# Patient Record
Sex: MALE | Race: WHITE | NOT HISPANIC OR LATINO | Employment: OTHER | URBAN - METROPOLITAN AREA
[De-identification: names, ages, dates, MRNs, and addresses within clinical notes are randomized per-mention and may not be internally consistent; named-entity substitution may affect disease eponyms.]

---

## 2019-07-31 ENCOUNTER — OFFICE VISIT (OUTPATIENT)
Dept: INTERNAL MEDICINE | Facility: CLINIC | Age: 79
End: 2019-07-31
Payer: MEDICARE

## 2019-07-31 VITALS
DIASTOLIC BLOOD PRESSURE: 68 MMHG | HEIGHT: 69 IN | HEART RATE: 70 BPM | TEMPERATURE: 97.8 F | BODY MASS INDEX: 26.14 KG/M2 | SYSTOLIC BLOOD PRESSURE: 108 MMHG | RESPIRATION RATE: 15 BRPM | OXYGEN SATURATION: 96 % | WEIGHT: 176.5 LBS

## 2019-07-31 DIAGNOSIS — K21.00 GASTROESOPHAGEAL REFLUX DISEASE WITH ESOPHAGITIS: ICD-10-CM

## 2019-07-31 DIAGNOSIS — M54.5 CHRONIC LOW BACK PAIN, UNSPECIFIED BACK PAIN LATERALITY, WITH SCIATICA PRESENCE UNSPECIFIED: Primary | ICD-10-CM

## 2019-07-31 DIAGNOSIS — R21 RASH: ICD-10-CM

## 2019-07-31 DIAGNOSIS — M25.552 HIP PAIN, LEFT: ICD-10-CM

## 2019-07-31 DIAGNOSIS — M80.00XD AGE-RELATED OSTEOPOROSIS WITH CURRENT PATHOLOGICAL FRACTURE WITH ROUTINE HEALING, SUBSEQUENT ENCOUNTER: ICD-10-CM

## 2019-07-31 DIAGNOSIS — H91.90 DEAFNESS, UNSPECIFIED LATERALITY: ICD-10-CM

## 2019-07-31 DIAGNOSIS — G89.29 CHRONIC LOW BACK PAIN, UNSPECIFIED BACK PAIN LATERALITY, WITH SCIATICA PRESENCE UNSPECIFIED: Primary | ICD-10-CM

## 2019-07-31 PROBLEM — M41.9 KYPHOSCOLIOSIS: Status: ACTIVE | Noted: 2019-07-31

## 2019-07-31 PROBLEM — M25.512 CHRONIC PAIN OF BOTH SHOULDERS: Status: ACTIVE | Noted: 2019-07-31

## 2019-07-31 PROBLEM — M25.511 CHRONIC PAIN OF BOTH SHOULDERS: Status: ACTIVE | Noted: 2019-07-31

## 2019-07-31 PROBLEM — E78.5 HYPERLIPIDEMIA LDL GOAL <130: Status: ACTIVE | Noted: 2019-07-31

## 2019-07-31 PROBLEM — H25.89 OTHER AGE-RELATED CATARACT: Status: ACTIVE | Noted: 2019-07-31

## 2019-07-31 PROCEDURE — 99204 OFFICE O/P NEW MOD 45 MIN: CPT | Performed by: INTERNAL MEDICINE

## 2019-07-31 RX ORDER — ALENDRONATE SODIUM 70 MG/1
70 TABLET ORAL
Qty: 12 TABLET | Refills: 3 | Status: SHIPPED | OUTPATIENT
Start: 2019-07-31 | End: 2021-02-08

## 2019-07-31 RX ORDER — ALENDRONATE SODIUM 70 MG/1
TABLET ORAL
COMMUNITY
Start: 2019-01-03 | End: 2019-07-31

## 2019-07-31 RX ORDER — FOLIC ACID 1 MG/1
TABLET ORAL
COMMUNITY
Start: 2019-04-20 | End: 2019-07-31

## 2019-07-31 RX ORDER — ACETAMINOPHEN 500 MG
500 TABLET ORAL 2 TIMES DAILY PRN
COMMUNITY

## 2019-07-31 RX ORDER — MULTIVITAMIN,THERAPEUTIC
1 TABLET ORAL DAILY
Qty: 90 TABLET | Refills: 3 | Status: SHIPPED | OUTPATIENT
Start: 2019-07-31 | End: 2020-09-02

## 2019-07-31 RX ORDER — SUCRALFATE 1 G/1
1 TABLET ORAL
COMMUNITY
Start: 2019-07-08 | End: 2023-01-01

## 2019-07-31 RX ORDER — DIPHENOXYLATE HYDROCHLORIDE AND ATROPINE SULFATE 2.5; .025 MG/1; MG/1
TABLET ORAL
Refills: 0 | COMMUNITY
Start: 2019-07-20 | End: 2021-02-08

## 2019-07-31 RX ORDER — FOLIC ACID 1 MG/1
1 TABLET ORAL DAILY
Qty: 90 TABLET | Refills: 3 | Status: SHIPPED | OUTPATIENT
Start: 2019-07-31 | End: 2020-09-02

## 2019-07-31 RX ORDER — MULTIVITAMIN,THERAPEUTIC
1 TABLET ORAL DAILY
COMMUNITY
End: 2019-07-31

## 2019-07-31 RX ORDER — TRIAMCINOLONE ACETONIDE 1 MG/G
CREAM TOPICAL 2 TIMES DAILY
Qty: 45 G | Refills: 0 | Status: SHIPPED | OUTPATIENT
Start: 2019-07-31 | End: 2019-12-16

## 2019-07-31 ASSESSMENT — MIFFLIN-ST. JEOR: SCORE: 1495.98

## 2019-07-31 NOTE — PROGRESS NOTES
Subjective     Michelle Wright is a 79 year old male who presents to clinic today for the following health issues:    HPI   New Patient/Transfer of Care.  Michelle is a brother of 1 of the patient's I see Mr. Nabor Wright.  The patient is deaf and requires a signing  to aid in conversation.  According to the patient's brother he was living in [place] and has been getting his health care through [facility] and is now transferred down here and living in the same building that Nabor and his family are living in.    Musculoskeletal problem/pain      Duration: 1 yr- intermittent     Description  Location: left hip     Intensity:  Mild to moderate     Accompanying signs and symptoms: Only bothersome with prolonged walking     History  Previous similar problem: no   Previous evaluation:  none    Precipitating or alleviating factors:  Trauma or overuse: no   Aggravating factors include: Painful with walking long distances     Therapies tried and outcome: acetaminophen, help a little     Other concerns:  1. Intermittent lower back ache, sharp pain with quick movements or getting up too fast- present 5+ yrs   2. Rash on right lower leg and foot, is not applying any ointments- present 10-20 yrs?  3. Concerns about weight gain around midsection, would like to discuss diet and ideal weight range   4. Stiffness, bulging and pain in left thumb  5. Nasal congestion, does not feel like his passages are clear. Brother states patient had nasal surgery in the past. Frequent sneezing episodes   6. Barrets disease  7. Frequent night time urination, waking up several times nightly and having a slow stream     Patient Active Problem List   Diagnosis     Chronic low back pain, unspecified back pain laterality, with sciatica presence unspecified     Deafness, unspecified laterality     Age-related osteoporosis with current pathological fracture with routine healing, subsequent encounter     Gastroesophageal reflux disease with  "esophagitis     Other age-related cataract     Chronic pain of both shoulders     Kyphoscoliosis     Hyperlipidemia LDL goal <130     No past surgical history on file.    Social History     Tobacco Use     Smoking status: Not on file   Substance Use Topics     Alcohol use: Not on file     No family history on file.      No current outpatient medications on file.     Allergies not on file  BP Readings from Last 3 Encounters:   No data found for BP    Wt Readings from Last 3 Encounters:   No data found for Wt           Reviewed and updated as needed this visit by Provider         Review of Systems   ROS COMP: CONSTITUTIONAL: NEGATIVE for fever, chills, change in weight  ENT/MOUTH: NEGATIVE for ear, mouth and throat problems  RESP: NEGATIVE for significant cough or SOB  CV: NEGATIVE for chest pain, palpitations or peripheral edema  GI: NEGATIVE for nausea, abdominal pain, + heartburn, or change in bowel habits  : NEGATIVE for frequency, dysuria, or hematuria  MUSCULOSKELETAL: NEGATIVE for significant arthralgias or myalgia  NEURO: NEGATIVE for weakness, dizziness or paresthesias  HEME: NEGATIVE for bleeding problems  PSYCHIATRIC: NEGATIVE for changes in mood or affect      Objective    /68   Pulse 70   Temp 97.8  F (36.6  C) (Oral)   Resp 15   Ht 1.753 m (5' 9\")   Wt 80.1 kg (176 lb 8 oz)   SpO2 96%   BMI 26.06 kg/m    Body mass index is 26.06 kg/m .  Physical Exam   GENERAL:  alert and no distress, deaf  EYES: Eyes grossly normal to inspection, PERRL and conjunctivae and sclerae normal  HENT: ear canals and TM's normal, nose and mouth without ulcers or lesions  NECK: no adenopathy, no asymmetry, masses, or scars and thyroid normal to palpation  RESP: lungs clear to auscultation - no rales, rhonchi or wheezes  CV: regular rate and rhythm, normal S1 S2, no S3 or S4, no click or rub, no peripheral edema and peripheral pulses strong  MS: no gross musculoskeletal defects noted with mild degenerative joint " disease and osteoarthritic changes noted to the knees and hands.  No edema  NEURO: No changes noted although gait is slowed primarily due to orthopedic issues and patient is using a wheeled walker  PSYCH: mentation appears normal, affect normal/bright  There is a superficial scaly skin lesion with noted to the right lower extremity medial and below the knee roughly the size of a silver dollar.  There is also superficial static changes to the lower extremities with varicose veins noted bilaterally.        Assessment & Plan     1. Chronic low back pain, unspecified back pain laterality, with sciatica presence unspecified  Suggest trial of physical therapy and consideration of improvement based on this as well as ongoing potential need for orthopedic referral  - [facility]FERR[facility]; Future    2. Gastroesophageal reflux disease with esophagitis  Refilled medication and suggested repeat upper endoscopy as review of chart indicates the patient is roughly 2 years out and does note mild issues with occasional swallowing issues  - folic acid (FOLVITE) 1 MG tablet; Take 1 tablet (1 mg) by mouth daily  Dispense: 90 tablet; Refill: 3  - multivitamin, therapeutic (THERA-VIT) TABS tablet; Take 1 tablet by mouth daily  Dispense: 90 tablet; Refill: 3  - GASTROENTEROLOGY ADULT REF PROCEDURE ONLY Susan Malik [phone]; No Provider Preference    3. Age-related osteoporosis with current pathological fracture with routine healing, subsequent encounter  Continue with Fosamax as ordered in combination with vitamin D and supplemental calcium  - alendronate (FOSAMAX) 70 MG tablet; Take 1 tablet (70 mg) by mouth every 7 days  Dispense: 12 tablet; Refill: 3    4. Deafness, unspecified laterality  Noted as needed for  at upcoming clinic visit    5. Hip pain, left  Suggested trial of physical therapy as directed.  - [facility] REFERRAL; Future    6. Rash  Suggest topical application  twice daily for 10 to 14 days and if no resolution dermatology appointment  - triamcinolone (KENALOG) 0.1 % external cream; Apply topically 2 times daily  Dispense: 45 g; Refill: 0       See Patient Instructions advised that he follow-up with me in 3 months for clinical assessment and ongoing memory care    No follow-ups on file.    Tevin Glasgow MD  [facility]

## 2019-09-17 ENCOUNTER — THERAPY VISIT (OUTPATIENT)
Dept: PHYSICAL THERAPY | Facility: CLINIC | Age: 79
End: 2019-09-17
Attending: INTERNAL MEDICINE
Payer: MEDICARE

## 2019-09-17 DIAGNOSIS — M54.42 CHRONIC BILATERAL LOW BACK PAIN WITH LEFT-SIDED SCIATICA: ICD-10-CM

## 2019-09-17 DIAGNOSIS — G89.29 CHRONIC BILATERAL LOW BACK PAIN WITH LEFT-SIDED SCIATICA: ICD-10-CM

## 2019-09-17 DIAGNOSIS — M54.5 CHRONIC LOW BACK PAIN, UNSPECIFIED BACK PAIN LATERALITY, WITH SCIATICA PRESENCE UNSPECIFIED: ICD-10-CM

## 2019-09-17 DIAGNOSIS — G89.29 CHRONIC LOW BACK PAIN, UNSPECIFIED BACK PAIN LATERALITY, WITH SCIATICA PRESENCE UNSPECIFIED: ICD-10-CM

## 2019-09-17 DIAGNOSIS — M25.552 HIP PAIN, LEFT: ICD-10-CM

## 2019-09-17 PROCEDURE — 97161 PT EVAL LOW COMPLEX 20 MIN: CPT | Mod: GP | Performed by: PHYSICAL THERAPIST

## 2019-09-17 PROCEDURE — 97110 THERAPEUTIC EXERCISES: CPT | Mod: GP | Performed by: PHYSICAL THERAPIST

## 2019-09-17 PROCEDURE — T1013 SIGN LANG/ORAL INTERPRETER: HCPCS | Mod: U3

## 2019-09-17 NOTE — LETTER
DEPARTMENT OF HEALTH AND HUMAN SERVICES  CENTERS FOR MEDICARE & MEDICAID SERVICES    PLAN/UPDATED PLAN OF PROGRESS FOR OUTPATIENT REHABILITATION    PATIENTS NAME:  Michelle Wright   : 1940  PROVID[facility] NUMBER:    [id number]  [id number]  PROVIDER NAME: [facility]  MEDICAL RECORD NUMBER: [id number]   START OF CARE DATE:  SOC Date: 19   TYPE:  PT  PRIMARY/TREATMENT DIAGNOSIS: (Pertinent Medical Diagnosis)     Chronic low back pain, unspecified back pain laterality, with sciatica presence unspecified  Hip pain, left  Chronic bilateral low back pain with left-sided sciatica    VISITS FROM START OF CARE:  Rxs Used: 1     [facility] Initial Evaluation  Subjective:  Onset of lumbar pain 5 years ago secondary to degenerative changes of the spine. Pt has had constant pain since. Pt referred by MD for physical therapy on 19    The history is provided by the patient. No  was used.   Type of problem:  Lumbar   Condition occurred with:  Degenerative joint disease. This is a chronic condition   Problem details: Pt describes pain as aching. Pt cn have sharp pain if he moves too fast. Pt rates pain as a 2/10..   Patient reports pain:  Lumbar spine right and lumbar spine left (L>R). Radiates to:  Thigh left. Associated symptoms:  Loss of motion/stiffness and loss of strength. Symptoms are exacerbated by twisting, bending, lifting, walking and standing and relieved by rest.    Objective:  Standing Alignment:    Shoulder/upper extremity deviations alignment: forward head and shoulder posture, thoracic kyphosis.    Gait:    Assistive Devices:  Walker  Flexibility/Screens:   Lower Extremity:  Decreased left lower extremity flexibility:Hamstrings  Decreased right lower extremity flexibility:  Hamstrings    Lumbar/SI Evaluation  ROM:    AROM Lumbar:   Flexion:            Moderate loss  Ext:                    Maximum loss   Side Bend:        Left:  Moderate loss     Right:  Moderate loss  Rotation:           Left:  Moderate loss    Right:  Moderate loss  Side Glide:        Left:     Right:            PATIENTS NAME:  Michelle Wright   : 1940     Lumbar Myotomes:  not assessed  Lumbar DTR's:  not assessed  Lumbar Dermtomes:  not assessed  Neural Tension/Mobility:  Lumbar:  Not assessed    Lumbar Palpation:  Palpation (lumbar): tender to palpation bilateral latissimus dorsi muscle.    Assessment/Plan:    Patient is a 79 year old male with lumbar complaints.    Patient has the following significant findings with corresponding treatment plan.                Diagnosis 1:  Bilateral lumbar pain radiating into the left lower extremity  Pain -  hot/cold therapy, self management, education and home program  Decreased ROM/flexibility - therapeutic exercise, therapeutic activity, home program and manual therapy as indicated  Decreased strength - therapeutic exercise, therapeutic activities and home program    Therapy Evaluation Codes:   1) History comprised of:   Personal factors that impact the plan of care:      Age and Time since onset of symptoms.    Comorbidity factors that impact the plan of care are:      Weakness and hearing loss, calf pain , swelling, warmth.     Medications impacting care: Pain.  2) Examination of Body Systems comprised of:   Body structures and functions that impact the plan of care:      Lumbar spine and Thoracic Spine.   Activity limitations that impact the plan of care are:      Bending, Dressing, Lifting, Stairs, Standing, Walking and Laying down.  3) Clinical presentation characteristics are:   Evolving/Changing.  4) Decision-Making    Moderate complexity using standardized patient assessment instrument and/or measureable assessment of functional outcome.  Cumulative Therapy Evaluation is: Moderate complexity.    Previous and current functional limitations:  (See Goal Flow Sheet for this information)    Short term and Long term goals: (See Goal Flow  "Sheet for this information)     Communication ability:  Patient appears to be able to clearly communicate and understand verbal and written communication and follow directions correctly.  Treatment Explanation - The following has been discussed with the patient:   RX ordered/plan of care  Anticipated outcomes  Possible risks and side effects  This patient would benefit from PT intervention to resume normal activities.   Rehab potential is good.    Frequency:  1 X week, once daily  Duration:  for 6 weeks  Discharge Plan:  Achieve all LTG.  Independent in home treatment program.  Reach maximal therapeutic benefit.    Subjective:  General health as reported by patient is good. Pertinent medical history includes:  Thyroid problems.      Current medications:  Pain medication.   Primary job tasks include:  Lifting/carrying.           Patient is retired.   PATIENTS NAME:  Michelle Wright   : 1940    Caregiver Signature/Credentials _____________________________ Date ________       Treating Provider: Yuri Glasgow PT   I have reviewed and certified the need for these services and plan of treatment while under my care.        PHYSICIAN'S SIGNATURE:   _____________________________________  Date___________    Tevin Glasgow MD    Certification period:  Beginning of Cert date period: 19 to  End of Cert period date: 12/15/19     Functional Level Progress Report: Please see attached \"Goal Flow sheet for Functional level.\"    ____X____ Continue Services or       ________ DC Services                Service dates: From  SOC Date: 19 date to present                         "

## 2019-09-17 NOTE — PROGRESS NOTES
[facility] Initial Evaluation  Subjective:  Onset of lumbar pain 5 years ago secondary to degenerative changes of the spine. Pt has had constant pain since. Pt referred by MD for physical therapy on 7-31-19    The history is provided by the patient. No  was used.   Type of problem:  Lumbar   Condition occurred with:  Degenerative joint disease. This is a chronic condition   Problem details: Pt describes pain as aching. Pt cn have sharp pain if he moves too fast. Pt rates pain as a 2/10..   Patient reports pain:  Lumbar spine right and lumbar spine left (L>R). Radiates to:  Thigh left. Associated symptoms:  Loss of motion/stiffness and loss of strength. Symptoms are exacerbated by twisting, bending, lifting, walking and standing and relieved by rest.                      Objective:  Standing Alignment:      Shoulder/upper extremity deviations alignment: forward head and shoulder posture, thoracic kyphosis.              Gait:    Assistive Devices:  Walker      Flexibility/Screens:       Lower Extremity:  Decreased left lower extremity flexibility:Hamstrings    Decreased right lower extremity flexibility:  Hamstrings               Lumbar/SI Evaluation  ROM:    AROM Lumbar:   Flexion:            Moderate loss  Ext:                    Maximum loss   Side Bend:        Left:  Moderate loss    Right:  Moderate loss  Rotation:           Left:  Moderate loss    Right:  Moderate loss  Side Glide:        Left:     Right:           Lumbar Myotomes:  not assessed            Lumbar DTR's:  not assessed        Lumbar Dermtomes:  not assessed                Neural Tension/Mobility:  Lumbar:  Not assessed        Lumbar Palpation:  Palpation (lumbar): tender to palpation bilateral latissimus dorsi muscle.                                                         General     ROS    Assessment/Plan:    Patient is a 79 year old male with lumbar complaints.    Patient has the following significant  findings with corresponding treatment plan.                Diagnosis 1:  Bilateral lumbar pain radiating into the left lower extremity  Pain -  hot/cold therapy, self management, education and home program  Decreased ROM/flexibility - therapeutic exercise, therapeutic activity, home program and manual therapy as indicated  Decreased strength - therapeutic exercise, therapeutic activities and home program    Therapy Evaluation Codes:   1) History comprised of:   Personal factors that impact the plan of care:      Age and Time since onset of symptoms.    Comorbidity factors that impact the plan of care are:      Weakness and hearing loss, calf pain , swelling, warmth.     Medications impacting care: Pain.  2) Examination of Body Systems comprised of:   Body structures and functions that impact the plan of care:      Lumbar spine and Thoracic Spine.   Activity limitations that impact the plan of care are:      Bending, Dressing, Lifting, Stairs, Standing, Walking and Laying down.  3) Clinical presentation characteristics are:   Evolving/Changing.  4) Decision-Making    Moderate complexity using standardized patient assessment instrument and/or measureable assessment of functional outcome.  Cumulative Therapy Evaluation is: Moderate complexity.    Previous and current functional limitations:  (See Goal Flow Sheet for this information)    Short term and Long term goals: (See Goal Flow Sheet for this information)     Communication ability:  Patient appears to be able to clearly communicate and understand verbal and written communication and follow directions correctly.  Treatment Explanation - The following has been discussed with the patient:   RX ordered/plan of care  Anticipated outcomes  Possible risks and side effects  This patient would benefit from PT intervention to resume normal activities.   Rehab potential is good.    Frequency:  1 X week, once daily  Duration:  for 6 weeks  Discharge Plan:  Achieve all  LTG.  Independent in home treatment program.  Reach maximal therapeutic benefit.    Please refer to the daily flowsheet for treatment today, total treatment time and time spent performing 1:1 timed codes.

## 2019-09-18 NOTE — PROGRESS NOTES
[facility] Initial Evaluation  Subjective:      General health as reported by patient is good. Pertinent medical history includes:  Thyroid problems.      Current medications:  Pain medication.   Primary job tasks include:  Lifting/carrying.           Patient is retired.                           Objective:  System    Physical Exam    General     ROS    Assessment/Plan:

## 2019-09-24 ENCOUNTER — THERAPY VISIT (OUTPATIENT)
Dept: PHYSICAL THERAPY | Facility: CLINIC | Age: 79
End: 2019-09-24
Payer: MEDICARE

## 2019-09-24 DIAGNOSIS — M54.42 CHRONIC BILATERAL LOW BACK PAIN WITH LEFT-SIDED SCIATICA: ICD-10-CM

## 2019-09-24 DIAGNOSIS — G89.29 CHRONIC BILATERAL LOW BACK PAIN WITH LEFT-SIDED SCIATICA: ICD-10-CM

## 2019-09-24 PROCEDURE — 97110 THERAPEUTIC EXERCISES: CPT | Mod: GP | Performed by: PHYSICAL THERAPY ASSISTANT

## 2019-09-24 PROCEDURE — 97530 THERAPEUTIC ACTIVITIES: CPT | Mod: GP | Performed by: PHYSICAL THERAPY ASSISTANT

## 2019-10-01 ENCOUNTER — THERAPY VISIT (OUTPATIENT)
Dept: PHYSICAL THERAPY | Facility: CLINIC | Age: 79
End: 2019-10-01
Payer: MEDICARE

## 2019-10-01 DIAGNOSIS — G89.29 CHRONIC BILATERAL LOW BACK PAIN WITH LEFT-SIDED SCIATICA: ICD-10-CM

## 2019-10-01 DIAGNOSIS — M54.42 CHRONIC BILATERAL LOW BACK PAIN WITH LEFT-SIDED SCIATICA: ICD-10-CM

## 2019-10-01 PROBLEM — M54.50 CHRONIC LOW BACK PAIN: Status: ACTIVE | Noted: 2019-07-31

## 2019-10-01 PROCEDURE — 97112 NEUROMUSCULAR REEDUCATION: CPT | Mod: GP | Performed by: PHYSICAL THERAPIST

## 2019-10-01 PROCEDURE — 97110 THERAPEUTIC EXERCISES: CPT | Mod: GP | Performed by: PHYSICAL THERAPIST

## 2019-10-01 PROCEDURE — T1013 SIGN LANG/ORAL INTERPRETER: HCPCS | Mod: U3

## 2019-10-08 ENCOUNTER — THERAPY VISIT (OUTPATIENT)
Dept: PHYSICAL THERAPY | Facility: CLINIC | Age: 79
End: 2019-10-08
Payer: MEDICARE

## 2019-10-08 DIAGNOSIS — G89.29 CHRONIC BILATERAL LOW BACK PAIN WITH LEFT-SIDED SCIATICA: ICD-10-CM

## 2019-10-08 DIAGNOSIS — M54.42 CHRONIC BILATERAL LOW BACK PAIN WITH LEFT-SIDED SCIATICA: ICD-10-CM

## 2019-10-08 PROCEDURE — T1013 SIGN LANG/ORAL INTERPRETER: HCPCS | Mod: U3 | Performed by: PHYSICAL THERAPY ASSISTANT

## 2019-10-15 ENCOUNTER — THERAPY VISIT (OUTPATIENT)
Dept: PHYSICAL THERAPY | Facility: CLINIC | Age: 79
End: 2019-10-15
Payer: MEDICARE

## 2019-10-15 DIAGNOSIS — M54.42 CHRONIC BILATERAL LOW BACK PAIN WITH LEFT-SIDED SCIATICA: ICD-10-CM

## 2019-10-15 DIAGNOSIS — G89.29 CHRONIC BILATERAL LOW BACK PAIN WITH LEFT-SIDED SCIATICA: ICD-10-CM

## 2019-10-15 PROCEDURE — T1013 SIGN LANG/ORAL INTERPRETER: HCPCS | Mod: U3

## 2019-10-15 PROCEDURE — 97530 THERAPEUTIC ACTIVITIES: CPT | Mod: GP | Performed by: PHYSICAL THERAPIST

## 2019-10-15 PROCEDURE — 97110 THERAPEUTIC EXERCISES: CPT | Mod: GP | Performed by: PHYSICAL THERAPIST

## 2019-10-15 NOTE — PROGRESS NOTES
Subjective:  HPI                    Objective:  System    Physical Exam    General     ROS    Assessment/Plan:    SUBJECTIVE  Subjective changes as noted by pt:  Pt notes increased strength. Pt notes pain only on the left lumbar region  Current pain level: 3/10     Changes in function:  Pt notes increased ease with sitting and standing up straight     Adverse reaction to treatment or activity:  None    OBJECTIVE  Changes in objective findings:  Progressed with strengthening of the lumbar extensors and scapular stabilizers.         ASSESSMENT  Michelle continues to require intervention to meet STG and LTG's: PT  Patient's symptoms are resolving.  Response to therapy has shown an improvement in strength  Progress made towards STG/LTG?  Yes,     PLAN  Current treatment program is being advanced to more complex exercises.    PTA/ATC plan:  N/A    Please refer to the daily flowsheet for treatment today, total treatment time and time spent performing 1:1 timed codes.

## 2019-10-22 ENCOUNTER — THERAPY VISIT (OUTPATIENT)
Dept: PHYSICAL THERAPY | Facility: CLINIC | Age: 79
End: 2019-10-22
Payer: MEDICARE

## 2019-10-22 DIAGNOSIS — M54.42 CHRONIC BILATERAL LOW BACK PAIN WITH LEFT-SIDED SCIATICA: ICD-10-CM

## 2019-10-22 DIAGNOSIS — G89.29 CHRONIC BILATERAL LOW BACK PAIN WITH LEFT-SIDED SCIATICA: ICD-10-CM

## 2019-10-22 PROCEDURE — 97110 THERAPEUTIC EXERCISES: CPT | Mod: GP | Performed by: PHYSICAL THERAPIST

## 2019-10-22 PROCEDURE — T1013 SIGN LANG/ORAL INTERPRETER: HCPCS | Mod: U3

## 2019-10-22 PROCEDURE — 97530 THERAPEUTIC ACTIVITIES: CPT | Mod: GP | Performed by: PHYSICAL THERAPIST

## 2019-10-23 ENCOUNTER — OFFICE VISIT (OUTPATIENT)
Dept: INTERNAL MEDICINE | Facility: CLINIC | Age: 79
End: 2019-10-23
Payer: MEDICARE

## 2019-10-23 VITALS
WEIGHT: 173.7 LBS | DIASTOLIC BLOOD PRESSURE: 80 MMHG | TEMPERATURE: 98.3 F | HEART RATE: 72 BPM | SYSTOLIC BLOOD PRESSURE: 130 MMHG | RESPIRATION RATE: 16 BRPM | OXYGEN SATURATION: 94 % | BODY MASS INDEX: 25.65 KG/M2

## 2019-10-23 DIAGNOSIS — D50.9 IRON DEFICIENCY ANEMIA, UNSPECIFIED IRON DEFICIENCY ANEMIA TYPE: ICD-10-CM

## 2019-10-23 DIAGNOSIS — K21.00 GASTROESOPHAGEAL REFLUX DISEASE WITH ESOPHAGITIS: Primary | ICD-10-CM

## 2019-10-23 PROCEDURE — 99214 OFFICE O/P EST MOD 30 MIN: CPT | Performed by: INTERNAL MEDICINE

## 2019-10-23 PROCEDURE — T1013 SIGN LANG/ORAL INTERPRETER: HCPCS | Mod: U3 | Performed by: INTERNAL MEDICINE

## 2019-10-23 NOTE — LETTER
[facility]  [address] [address]  [phone]        February 11, 2020    Michelle Wright  [address] [place] [id number]              Dear Michelle Wright    This is to remind you that your non-fasting labs is due.    You may call our office at [phone] to schedule an appointment.    Please disregard this notice if you have already had your labs drawn or made an appointment.        Sincerely,        Tevin Landeros

## 2019-10-23 NOTE — PROGRESS NOTES
Subjective     Michelle Wright is a 79 year old male who presents to clinic today for the following health issues:    HPI     The patient was seen today via an [facility] interpretor.    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  He comes to the clinic today with the aid of a signing  with complaints of increasing difficulty with heartburn belching, regurgitation and intermittent problems with swallowing and notable difficulty with spicy foods.  He does not report any difficulty with liquids.    Gastrointestinal symptoms      Duration: 1+ yr     Description:           REFLUX SYMPTOMS - heartburn, regurgitation of food, acid taste in mouth, belching, intermittent problems swallowing and cough.      Intensity:  severe    Accompanying signs and symptoms:  none, bloating and problems swallowing - on and off     History  Previous similar problem: YES- Atkinson's esophagus   Previous evaluation:  GI consult on 11/29/18 with [facility]     Aggravating factors: spicy foods    Alleviating factors:     Other Therapies tried: Currently on Omeprazole daily. Previously used Carafate 1 GM tablet as needed, does not believe he is using now.       Patient Active Problem List   Diagnosis     Chronic low back pain, unspecified back pain laterality, with sciatica presence unspecified     Deafness, unspecified laterality     Age-related osteoporosis with current pathological fracture with routine healing, subsequent encounter     Gastroesophageal reflux disease with esophagitis     Other age-related cataract     Chronic pain of both shoulders     Kyphoscoliosis     Hyperlipidemia LDL goal <130     Hip pain, left      Chronic bilateral low back pain with left-sided sciatica     No past surgical history on file.    Social History     Tobacco Use     Smoking status: Never Smoker     Smokeless tobacco: Never Used   Substance Use Topics     Alcohol use: Yes     Comment: Occasional wine      Family History   Problem Relation Age of Onset     Emphysema Father      Cancer Brother          Current Outpatient Medications   Medication Sig Dispense Refill     acetaminophen (TYLENOL) 500 MG tablet Take 500 mg by mouth 2 times daily as needed       alendronate (FOSAMAX) 70 MG tablet Take 1 tablet (70 mg) by mouth every 7 days 12 tablet 3     Calcium Carb-Cholecalciferol (CALCIUM/VITAMIN D) 600-400 MG-UNIT TABS Take 1 tablet by mouth daily       cyanocobalamin 1000 MCG SUBL DISSOLVE 1 TABLET BY MOUTH DAILY       folic acid (FOLVITE) 1 MG tablet Take 1 tablet (1 mg) by mouth daily 90 tablet 3     Multiple Vitamin (MULTI-VITAMIN DAILY PO) Take 1 tablet by mouth daily       Multiple Vitamin (MULTI-VITAMINS) TABS TK ONE T PO QD FOR SUPPLEMENT  0     multivitamin, therapeutic (THERA-VIT) TABS tablet Take 1 tablet by mouth daily 90 tablet 3     omeprazole (PRILOSEC) 20 MG DR capsule TAKE 1 CAPSULE BY MOUTH DAILY FOR ACID REFLUX       sucralfate (CARAFATE) 1 GM tablet Take 1 g by mouth       triamcinolone (KENALOG) 0.1 % external cream Apply topically 2 times daily 45 g 0     vitamin D3 (VITAMIN D3) 1000 units (25 mcg) tablet Take 1,000 Units by mouth       No Known Allergies  BP Readings from Last 3 Encounters:   07/31/19 108/68    Wt Readings from Last 3 Encounters:   07/31/19 80.1 kg (176 lb 8 oz)              Reviewed and updated as needed this visit by Provider       Review of Systems   ROS COMP: CONSTITUTIONAL: NEGATIVE for fever, chills, change in weight  ENT/MOUTH: NEGATIVE for ear, mouth and throat problems  RESP: NEGATIVE for significant cough or SOB  CV: NEGATIVE for chest pain, palpitations or peripheral edema  : NEGATIVE for frequency,  dysuria, or hematuria  MUSCULOSKELETAL: NEGATIVE for significant arthralgias or myalgia  NEURO: NEGATIVE for weakness, dizziness or paresthesias  HEME: NEGATIVE for bleeding problems  PSYCHIATRIC: NEGATIVE for changes in mood or affect      Objective    /80   Pulse 72   Temp 98.3  F (36.8  C) (Oral)   Resp 16   Wt 78.8 kg (173 lb 11.2 oz)   SpO2 94%   BMI 25.65 kg/m    There is no height or weight on file to calculate BMI.  Physical Exam   GENERAL: alert and no distress  EYES: Eyes grossly normal to inspection, PERRL and conjunctivae and sclerae normal  HENT: ear canals and TM's normal, nose and mouth without ulcers or lesions  NECK: no adenopathy, no asymmetry, masses, or scars and thyroid normal to palpation  RESP: lungs clear to auscultation - no rales, rhonchi or wheezes  CV: regular rate and rhythm, normal S1 S2, no S3 or S4, no murmur, click or rub, no peripheral edema and peripheral pulses strong  MS: no gross musculoskeletal defects noted, no edema  NEURO: No focal changes  PSYCH: mentation appears normal, affect normal/bright        Assessment & Plan     1. Gastroesophageal reflux disease with esophagitis  I have suggested that the patient increase his omeprazole to 20 mg twice daily and that we set him up for an upper endoscopy so they can take a better look and reassess his Atkinson's and evaluate for a source of his dysphasia if changed.  Patient was advised to the  that if the symptoms worsen or he noticed increasing difficulty with liquids or swallowing thereof that he contacts us prior.  I discussed with the patient that he should be avoiding anti-inflammatories and any gastric irritants inclusive of alcohol.  Suggested we recheck his CBC and B12 and folic acid level as well as a protein electrophoresis based on prior labs done  - GASTROENTEROLOGY ADULT REF PROCEDURE ONLY None      2.  (D50.9) Iron deficiency anemia, unspecified iron deficiency anemia type  Comment: labs  "ordered as future  Plan: Hemoglobin, Iron, Ferritin, Vitamin B12,         Folate, Protein electrophoresis             BMI:   Estimated body mass index is 26.06 kg/m  as calculated from the following:    Height as of 7/31/19: 1.753 m (5' 9\").    Weight as of 7/31/19: 80.1 kg (176 lb 8 oz).     See Patient Instructions    Return for diagnostic test as ordered.    Tevin Glasgow MD  [facility]    '  "

## 2019-11-05 ENCOUNTER — THERAPY VISIT (OUTPATIENT)
Dept: PHYSICAL THERAPY | Facility: CLINIC | Age: 79
End: 2019-11-05
Payer: MEDICARE

## 2019-11-05 DIAGNOSIS — M54.42 CHRONIC BILATERAL LOW BACK PAIN WITH LEFT-SIDED SCIATICA: ICD-10-CM

## 2019-11-05 DIAGNOSIS — G89.29 CHRONIC BILATERAL LOW BACK PAIN WITH LEFT-SIDED SCIATICA: ICD-10-CM

## 2019-11-05 PROCEDURE — 97530 THERAPEUTIC ACTIVITIES: CPT | Mod: GP | Performed by: PHYSICAL THERAPIST

## 2019-11-05 PROCEDURE — 97110 THERAPEUTIC EXERCISES: CPT | Mod: GP | Performed by: PHYSICAL THERAPIST

## 2019-11-05 PROCEDURE — T1013 SIGN LANG/ORAL INTERPRETER: HCPCS | Mod: U3

## 2019-11-26 ENCOUNTER — HOSPITAL ENCOUNTER (OUTPATIENT)
Facility: CLINIC | Age: 79
Discharge: HOME | End: 2019-11-26
Attending: SPECIALIST | Admitting: SPECIALIST
Payer: MEDICARE

## 2019-11-26 VITALS
SYSTOLIC BLOOD PRESSURE: 114 MMHG | OXYGEN SATURATION: 95 % | RESPIRATION RATE: 21 BRPM | DIASTOLIC BLOOD PRESSURE: 78 MMHG | HEART RATE: 68 BPM

## 2019-11-26 LAB — UPPER GI ENDOSCOPY: NORMAL

## 2019-11-26 PROCEDURE — 43239 EGD BIOPSY SINGLE/MULTIPLE: CPT | Mod: XS | Performed by: SPECIALIST

## 2019-11-26 PROCEDURE — 43249 ESOPH EGD DILATION <30 MM: CPT | Performed by: SPECIALIST

## 2019-11-26 PROCEDURE — 25000128 H RX IP 250 OP 636: Performed by: SPECIALIST

## 2019-11-26 PROCEDURE — 88305 TISSUE EXAM BY PATHOLOGIST: CPT | Performed by: SPECIALIST

## 2019-11-26 PROCEDURE — G0500 MOD SEDAT ENDO SERVICE >5YRS: HCPCS | Performed by: SPECIALIST

## 2019-11-26 PROCEDURE — 88305 TISSUE EXAM BY PATHOLOGIST: CPT | Mod: 26 | Performed by: SPECIALIST

## 2019-11-26 RX ORDER — FENTANYL CITRATE 50 UG/ML
INJECTION, SOLUTION INTRAMUSCULAR; INTRAVENOUS PRN
Status: DISCONTINUED | OUTPATIENT
Start: 2019-11-26 | End: 2019-11-26 | Stop reason: HOSPADM

## 2019-11-26 RX ORDER — ONDANSETRON 2 MG/ML
4 INJECTION INTRAMUSCULAR; INTRAVENOUS
Status: DISCONTINUED | OUTPATIENT
Start: 2019-11-26 | End: 2019-11-26 | Stop reason: HOSPADM

## 2019-11-26 RX ORDER — LIDOCAINE 40 MG/G
CREAM TOPICAL
Status: DISCONTINUED | OUTPATIENT
Start: 2019-11-26 | End: 2019-11-26 | Stop reason: HOSPADM

## 2019-11-26 NOTE — H&P
Pre-Endoscopy History and Physical     Michelle Wright MRN# [id number]   YOB: 1940 Age: 79 year old     Date of Procedure: 11/26/2019  Primary care provider: No Ref-Primary, Physician  Type of Endoscopy: Esophagogastroduodenoscopy with possible biopsy, possible dilation, possible foreign body removal  Reason for Procedure: reflux, cough, heartburn, Atkinson's, dysphagia  Type of Anesthesia Anticipated: Conscious Sedation    HPI:    Michelle is a 79 year old male who will be undergoing the above procedure.      A history and physical has been performed. The patient's medications and allergies have been reviewed. The risks and benefits of the procedure and the sedation options and risks were discussed with the patient.  All questions were answered and informed consent was obtained.      He denies a personal or family history of anesthesia complications or bleeding disorders.     Patient Active Problem List   Diagnosis     Chronic low back pain, unspecified back pain laterality, with sciatica presence unspecified     Deafness, unspecified laterality     Age-related osteoporosis with current pathological fracture with routine healing, subsequent encounter     Gastroesophageal reflux disease with esophagitis     Other age-related cataract     Chronic pain of both shoulders     Kyphoscoliosis     Hyperlipidemia LDL goal <130     Hip pain, left     Chronic bilateral low back pain with left-sided sciatica        History reviewed. No pertinent past medical history.     History reviewed. No pertinent surgical history.    Social History     Tobacco Use     Smoking status: Never Smoker     Smokeless tobacco: Never Used   Substance Use Topics     Alcohol use: Yes     Comment: Occasional wine        Family History   Problem Relation Age of Onset     Emphysema Father      Lung Cancer Father      Esophageal Cancer Brother        Prior to Admission medications    Medication Sig Start Date End Date Taking? Authorizing  "Provider   acetaminophen (TYLENOL) 500 MG tablet Take 500 mg by mouth 2 times daily as needed   Yes Reported, Patient   alendronate (FOSAMAX) 70 MG tablet Take 1 tablet (70 mg) by mouth every 7 days 7/31/19  Yes Tevin Glasgow MD   Calcium Carb-Cholecalciferol (CALCIUM/VITAMIN D) 600-400 MG-UNIT TABS Take 1 tablet by mouth daily   Yes Reported, Patient   cyanocobalamin 1000 MCG SUBL DISSOLVE 1 TABLET BY MOUTH DAILY 6/7/18  Yes Reported, Patient   folic acid (FOLVITE) 1 MG tablet Take 1 tablet (1 mg) by mouth daily 7/31/19  Yes Tevin Glasgow MD   Multiple Vitamin (MULTI-VITAMIN DAILY PO) Take 1 tablet by mouth daily   Yes Reported, Patient   Multiple Vitamin (MULTI-VITAMINS) TABS TK ONE T PO QD FOR SUPPLEMENT 7/20/19  Yes Reported, Patient   multivitamin, therapeutic (THERA-VIT) TABS tablet Take 1 tablet by mouth daily 7/31/19  Yes Tevin Glasgow MD   omeprazole (PRILOSEC) 20 MG DR capsule Take 1 capsule (20 mg) by mouth 2 times daily 10/23/19  Yes Tevin Glasgow MD   sucralfate (CARAFATE) 1 GM tablet Take 1 g by mouth 7/8/19  Yes Reported, Patient   triamcinolone (KENALOG) 0.1 % external cream Apply topically 2 times daily 7/31/19  Yes Tevin Glasgow MD   vitamin D3 (VITAMIN D3) 1000 units (25 mcg) tablet Take 1,000 Units by mouth 10/19/18  Yes Reported, Patient       No Known Allergies     REVIEW OF SYSTEMS:   5 point ROS negative except as noted above in HPI, including Gen., Resp., CV, GI &  system review.    PHYSICAL EXAM:   BP (!) 130/99   Pulse 81   Resp 16   SpO2 95%  Estimated body mass index is 25.65 kg/m  as calculated from the following:    Height as of 7/31/19: 1.753 m (5' 9\").    Weight as of 10/23/19: 78.8 kg (173 lb 11.2 oz).   GENERAL APPEARANCE: alert, and oriented  MENTAL STATUS: alert  AIRWAY EXAM: Mallampatti Class II (visualization of the soft palate, fauces, and uvula)  RESP: lungs clear to auscultation - no rales, rhonchi or wheezes  CV: regular rates and rhythm  DIAGNOSTICS:  "   Not indicated    IMPRESSION   ASA Class 2 - Mild systemic disease    PLAN:   Plan for Gastroscopy with possible biopsy, possible dilation. We discussed the risks, benefits and alternatives and the patient wished to proceed.    The above has been forwarded to the consulting provider.      Signed Electronically by: Yuri Casper MD  November 26, 2019
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
## 2019-11-29 LAB — COPATH REPORT: NORMAL

## 2019-11-29 NOTE — RESULT ENCOUNTER NOTE
Assessment: The pathologic findings are non specific; a cause of minimal acute inflammation was not identified. Atkinson's esophagus was not appreciated on endoscopy or biopsies. The cause of his symptoms is not clear; the differential diagnosis includes gastroesophagel reflux disease, an esophageal motility disorder (possibly related to the hiatus hernia) and alendronate (although most cases show more severe esophagitis with erosions or ulcerations). Clinical follow up will be needed to determine if dilation improves his intermittent dysphagia.

## 2019-12-10 ENCOUNTER — THERAPY VISIT (OUTPATIENT)
Dept: PHYSICAL THERAPY | Facility: CLINIC | Age: 79
End: 2019-12-10
Payer: MEDICARE

## 2019-12-10 DIAGNOSIS — G89.29 CHRONIC BILATERAL LOW BACK PAIN WITH LEFT-SIDED SCIATICA: ICD-10-CM

## 2019-12-10 DIAGNOSIS — M54.42 CHRONIC BILATERAL LOW BACK PAIN WITH LEFT-SIDED SCIATICA: ICD-10-CM

## 2019-12-10 PROCEDURE — 97530 THERAPEUTIC ACTIVITIES: CPT | Mod: GP | Performed by: PHYSICAL THERAPIST

## 2019-12-10 PROCEDURE — 97110 THERAPEUTIC EXERCISES: CPT | Mod: GP | Performed by: PHYSICAL THERAPIST

## 2019-12-10 NOTE — PROGRESS NOTES
Subjective:  HPI                    Objective:  System    Physical Exam    General     ROS    Assessment/Plan:    SUBJECTIVE  Subjective changes as noted by pt:  Pt continues to note intermittent pain in the left lumbar region. Pt admits to not performing home exercises on a consistent basis.      Current pain level: 2/10     Changes in function:  Pt still notes pain with prolonged ambulation or standing. Pt reports sitting and sleeping is going well.      Adverse reaction to treatment or activity:  None    OBJECTIVE  Changes in objective findings:  Pt remains limited with lumbar extension and rotation R>L. Pt demonstrates improved strength lumbar and thoracic extensors but remains weaker than normal.         ASSESSMENT  Michelle continues to require intervention to meet STG and LTG's: PT  Patient is progressing as expected.  Response to therapy has shown an improvement in function  and strength  Progress made towards STG/LTG?  Yes,     PLAN  Pt will continue with home exercise program and contact MD if pain increases or persists. Pt discharged at this time.     PTA/ATC plan:  N/A    Please refer to the daily flowsheet for treatment today, total treatment time and time spent performing 1:1 timed codes.

## 2019-12-16 DIAGNOSIS — R21 RASH: ICD-10-CM

## 2019-12-17 RX ORDER — TRIAMCINOLONE ACETONIDE 1 MG/G
CREAM TOPICAL
Qty: 45 G | Refills: 2 | Status: SHIPPED | OUTPATIENT
Start: 2019-12-17 | End: 2022-03-30

## 2019-12-17 NOTE — TELEPHONE ENCOUNTER
"Requested Prescriptions   Pending Prescriptions Disp Refills     triamcinolone (KENALOG) 0.1 % external cream [Pharmacy Med Name: TRIAMCINOLONE 0.1% CREAM 15GM] 45 g 0     Sig: APPLY EXTERNALLY TO THE AFFECTED AREA TWICE DAILY       Topical Steroids and Nonsteroidals Protocol Passed - 12/16/2019  1:27 PM        Passed - Patient is age 6 or older        Passed - Authorizing prescriber's most recent note related to this medication read.     If refill request is for ophthalmic use, please forward request to provider for approval.          Passed - High potency steroid not ordered        Passed - Recent (12 mo) or future (30 days) visit within the authorizing provider's specialty     Patient has had an office visit with the authorizing provider or a provider within the authorizing providers department within the previous 12 mos or has a future within next 30 days. See \"Patient Info\" tab in inbasket, or \"Choose Columns\" in Meds & Orders section of the refill encounter.              Passed - Medication is active on med list          "

## 2020-01-28 PROBLEM — M54.42 CHRONIC BILATERAL LOW BACK PAIN WITH LEFT-SIDED SCIATICA: Status: RESOLVED | Noted: 2019-09-17 | Resolved: 2020-01-28

## 2020-01-28 PROBLEM — G89.29 CHRONIC BILATERAL LOW BACK PAIN WITH LEFT-SIDED SCIATICA: Status: RESOLVED | Noted: 2019-09-17 | Resolved: 2020-01-28

## 2020-01-28 NOTE — PROGRESS NOTES
Discharge Note    Progress reporting period is from last progress note on 11/05/19 to Dec 10, 2019.    Michelle failed to follow up and current status is unknown.  Please see information below for last relevant information on current status.  Patient seen for 8 visits.    SUBJECTIVE  Subjective changes noted by patient:     .  Current pain level is 2/10.     Previous pain level was  2/10.   Changes in function:  Yes (See Goal flowsheet attached for changes in current functional level)  Adverse reaction to treatment or activity: None    OBJECTIVE  Changes noted in objective findings:       ASSESSMENT/PLAN  Diagnosis: bilateral lumbar pain radiating into left lower extremity   Updated problem list and treatment plan:   Pain - HEP  Decreased ROM/flexibility - HEP  Decreased strength - HEP  STG/LTGs have been met or progress has been made towards goals:  Yes, please see goal flowsheet for most current information  Assessment of Progress: current status is unknown.    Last current status:     Self Management Plans:  HEP  I have re-evaluated this patient and find that the nature, scope, duration and intensity of the therapy is appropriate for the medical condition of the patient.  Michelle continues to require the following intervention to meet STG and LTG's:  HEP.    Recommendations:  Discharge with current home program.  Patient to follow up with MD as needed.    Please refer to the daily flowsheet for treatment today, total treatment time and time spent performing 1:1 timed codes.

## 2020-03-11 ENCOUNTER — HEALTH MAINTENANCE LETTER (OUTPATIENT)
Age: 80
End: 2020-03-11

## 2020-10-21 ENCOUNTER — TELEPHONE (OUTPATIENT)
Dept: INTERNAL MEDICINE | Facility: CLINIC | Age: 80
End: 2020-10-21

## 2020-10-21 NOTE — TELEPHONE ENCOUNTER
Patient Quality Outreach      Summary:    Patient is due/failing the following:   Annual wellness, date due: 1/19/03 and Immunizations    Type of outreach:    Sent [employer] message.    Questions for provider review:    None                                                                                   **Start Working phrase here:**     Patient Active Problem List   Diagnosis     Chronic low back pain, unspecified back pain laterality, with sciatica presence unspecified     Deafness, unspecified laterality     Age-related osteoporosis with current pathological fracture with routine healing, subsequent encounter     Gastroesophageal reflux disease with esophagitis     Other age-related cataract     Chronic pain of both shoulders     Kyphoscoliosis     Hyperlipidemia LDL goal <130     Hip pain, left         Patient has the following on his problem list/HM:   Immunizations       Health Maintenance Due   Topic     Flu Vaccine (1)                                                     Lianne Weinberg CMA       Chart routed to Care Team.

## 2020-11-27 DIAGNOSIS — M80.00XD AGE-RELATED OSTEOPOROSIS WITH CURRENT PATHOLOGICAL FRACTURE WITH ROUTINE HEALING, SUBSEQUENT ENCOUNTER: ICD-10-CM

## 2020-11-27 DIAGNOSIS — K21.00 GASTROESOPHAGEAL REFLUX DISEASE WITH ESOPHAGITIS: ICD-10-CM

## 2020-11-30 RX ORDER — FOLIC ACID 1 MG/1
1 TABLET ORAL DAILY
Qty: 90 TABLET | Refills: 0 | OUTPATIENT
Start: 2020-11-30

## 2020-11-30 RX ORDER — MULTIVITAMIN
TABLET ORAL DAILY
OUTPATIENT
Start: 2020-11-30

## 2020-11-30 NOTE — TELEPHONE ENCOUNTER
Folic Acid & Multi vitamin    Routing refill request to provider for review/approval because:  Sade given x1 and patient did not follow up, please advise  Patient needs to be seen because it has been more than 1 year since last office visit.    Last office visit: 10/23/2019 with prescribing provider:  Future Office Visit:        Daiana JAVIERN, RN, PHN

## 2020-12-04 DIAGNOSIS — K21.00 GASTROESOPHAGEAL REFLUX DISEASE WITH ESOPHAGITIS: ICD-10-CM

## 2020-12-04 DIAGNOSIS — M80.00XD AGE-RELATED OSTEOPOROSIS WITH CURRENT PATHOLOGICAL FRACTURE WITH ROUTINE HEALING, SUBSEQUENT ENCOUNTER: ICD-10-CM

## 2020-12-07 ENCOUNTER — MYC MEDICAL ADVICE (OUTPATIENT)
Dept: INTERNAL MEDICINE | Facility: CLINIC | Age: 80
End: 2020-12-07

## 2020-12-07 RX ORDER — MULTIVITAMIN
1 TABLET ORAL DAILY
Qty: 30 TABLET | Refills: 0 | Status: SHIPPED | OUTPATIENT
Start: 2020-12-07 | End: 2021-02-08

## 2020-12-07 RX ORDER — FOLIC ACID 1 MG/1
1 TABLET ORAL DAILY
Qty: 30 TABLET | Refills: 0 | Status: SHIPPED | OUTPATIENT
Start: 2020-12-07 | End: 2021-02-08

## 2021-01-03 ENCOUNTER — HEALTH MAINTENANCE LETTER (OUTPATIENT)
Age: 81
End: 2021-01-03

## 2021-01-29 DIAGNOSIS — M80.00XD AGE-RELATED OSTEOPOROSIS WITH CURRENT PATHOLOGICAL FRACTURE WITH ROUTINE HEALING, SUBSEQUENT ENCOUNTER: ICD-10-CM

## 2021-01-29 DIAGNOSIS — K21.00 GASTROESOPHAGEAL REFLUX DISEASE WITH ESOPHAGITIS: ICD-10-CM

## 2021-02-01 RX ORDER — MULTIVITAMIN
1 TABLET ORAL DAILY
Qty: 30 TABLET | Refills: 0 | OUTPATIENT
Start: 2021-02-01

## 2021-02-01 RX ORDER — FOLIC ACID 1 MG/1
1 TABLET ORAL DAILY
Qty: 30 TABLET | Refills: 0 | OUTPATIENT
Start: 2021-02-01

## 2021-02-06 ASSESSMENT — ACTIVITIES OF DAILY LIVING (ADL): CURRENT_FUNCTION: NO ASSISTANCE NEEDED

## 2021-02-08 ENCOUNTER — OFFICE VISIT (OUTPATIENT)
Dept: INTERNAL MEDICINE | Facility: CLINIC | Age: 81
End: 2021-02-08
Payer: MEDICARE

## 2021-02-08 VITALS
DIASTOLIC BLOOD PRESSURE: 82 MMHG | OXYGEN SATURATION: 94 % | RESPIRATION RATE: 16 BRPM | TEMPERATURE: 98.9 F | HEART RATE: 89 BPM | BODY MASS INDEX: 26.41 KG/M2 | HEIGHT: 69 IN | WEIGHT: 178.3 LBS | SYSTOLIC BLOOD PRESSURE: 136 MMHG

## 2021-02-08 DIAGNOSIS — M25.512 CHRONIC LEFT SHOULDER PAIN: ICD-10-CM

## 2021-02-08 DIAGNOSIS — K43.9 HERNIA OF ABDOMINAL WALL: ICD-10-CM

## 2021-02-08 DIAGNOSIS — M80.00XD AGE-RELATED OSTEOPOROSIS WITH CURRENT PATHOLOGICAL FRACTURE WITH ROUTINE HEALING, SUBSEQUENT ENCOUNTER: ICD-10-CM

## 2021-02-08 DIAGNOSIS — H91.90 DEAFNESS, UNSPECIFIED LATERALITY: ICD-10-CM

## 2021-02-08 DIAGNOSIS — E78.5 HYPERLIPIDEMIA LDL GOAL <130: ICD-10-CM

## 2021-02-08 DIAGNOSIS — G89.29 CHRONIC LEFT SHOULDER PAIN: ICD-10-CM

## 2021-02-08 DIAGNOSIS — Z12.11 COLON CANCER SCREENING: ICD-10-CM

## 2021-02-08 DIAGNOSIS — G89.29 CHRONIC LOW BACK PAIN, UNSPECIFIED BACK PAIN LATERALITY, UNSPECIFIED WHETHER SCIATICA PRESENT: ICD-10-CM

## 2021-02-08 DIAGNOSIS — M81.0 OSTEOPOROSIS, UNSPECIFIED OSTEOPOROSIS TYPE, UNSPECIFIED PATHOLOGICAL FRACTURE PRESENCE: ICD-10-CM

## 2021-02-08 DIAGNOSIS — Z00.00 ENCOUNTER FOR MEDICARE ANNUAL WELLNESS EXAM: Primary | ICD-10-CM

## 2021-02-08 DIAGNOSIS — M54.50 CHRONIC LOW BACK PAIN, UNSPECIFIED BACK PAIN LATERALITY, UNSPECIFIED WHETHER SCIATICA PRESENT: ICD-10-CM

## 2021-02-08 DIAGNOSIS — K21.00 GASTROESOPHAGEAL REFLUX DISEASE WITH ESOPHAGITIS WITHOUT HEMORRHAGE: ICD-10-CM

## 2021-02-08 PROCEDURE — G0438 PPPS, INITIAL VISIT: HCPCS | Performed by: INTERNAL MEDICINE

## 2021-02-08 PROCEDURE — 99213 OFFICE O/P EST LOW 20 MIN: CPT | Mod: 25 | Performed by: INTERNAL MEDICINE

## 2021-02-08 RX ORDER — FOLIC ACID 1 MG/1
1 TABLET ORAL DAILY
Qty: 30 TABLET | Refills: 0 | Status: SHIPPED | OUTPATIENT
Start: 2021-02-08 | End: 2021-03-09

## 2021-02-08 RX ORDER — MULTIVITAMIN
1 TABLET ORAL DAILY
Qty: 90 TABLET | Refills: 3 | Status: SHIPPED | OUTPATIENT
Start: 2021-02-08 | End: 2022-01-31

## 2021-02-08 RX ORDER — ALENDRONATE SODIUM 70 MG/1
70 TABLET ORAL
Qty: 12 TABLET | Refills: 3 | Status: SHIPPED | OUTPATIENT
Start: 2021-02-08 | End: 2022-02-10

## 2021-02-08 ASSESSMENT — MIFFLIN-ST. JEOR: SCORE: 1494.14

## 2021-02-08 NOTE — PROGRESS NOTES
"  SUBJECTIVE:   Michelle Wright is a 81 year old male who presents for Preventive Visit.  Patient has been advised of split billing requirements and indicates understanding: Yes  Are you in the first 12 months of your Medicare Part B coverage?  No    The patient was seen today via an ASL interpretor.    Patient has not been seen in clinic since 2019.    Answers for HPI/ROS submitted by the patient on 2021   Annual Exam:  In general, how would you rate your overall physical health?: good  Frequency of exercise:: 2-3 days/week  Do you usually eat at least 4 servings of fruit and vegetables a day, include whole grains & fiber, and avoid regularly eating high fat or \"junk\" foods? : Yes  Taking medications regularly:: Yes  Medication side effects:: None  Activities of Daily Living: no assistance needed  Home safety: no safety concerns identified  Hearing Impairment:: no hearing concerns  In the past 6 months, have you been bothered by leaking of urine?: No  In general, how would you rate your overall mental or emotional health?: excellent  Additional concerns today:: No  Duration of exercise:: 15-30 minutes      Mental Health:    In general, how would you rate your overall mental or emotional health? good  PHQ-2 Score: (P) 0    Do you feel safe in your environment? Yes    Have you ever done Advance Care Planning? (For example, a Health Directive, POLST, or a discussion with a medical provider or your loved ones about your wishes): Yes, patient states has an Advance Care Planning document and will bring a copy to the clinic.    Additional concerns to address?  YES    Fall risk:  Fallen 2 or more times in the past year?: No  Any fall with injury in the past year?: No  Cognitive Screenin) Repeat 3 items (Leader, Season, Table)    2) Clock draw: NORMAL  3) 3 item recall: Recalls 3 objects  Results: 3 items recalled: COGNITIVE IMPAIRMENT LESS LIKELY    Mini-CogTM Copyright S [place]. Licensed by the author for use " in [facility]; reprinted with permission (walkermoe@[email or web address]). All rights reserved.      Do you have sleep apnea, excessive snoring or daytime drowsiness?: no       Other concerns: History obtained via ASL.  1. Lower back pain with sitting down too quickly or sitting on a hard back chair.  Patient reports no obvious trauma.  His pain appears to be worse with quick movements and not as bad with slower movements.  2. Concerns about LLQ hernia, has noticed slight swelling in area.  Have been present for quite some time.  There is no nausea, vomiting, stool change or abdominal discomfort.  3. Discuss seeing dietician for weight.  Reviewed weight curve with patient has been relatively stable.  4. Intermittent left shoulder pain, been reporting intermittent symptoms worse left greater than right.  No obvious trauma or falls.  5. Requesting cholesterol check as per routine.    Reviewed and updated as needed this visit by clinical staff               Reviewed and updated as needed this visit by Provider                Social History     Tobacco Use     Smoking status: Never Smoker     Smokeless tobacco: Never Used   Substance Use Topics     Alcohol use: Yes     Comment: Occasional wine                            Current providers sharing in care for this patient include:   Patient Care Team:  Tevin Galsgow MD as PCP - General (Internal Medicine)  Tevin Glasgow MD as Assigned PCP    The following health maintenance items are reviewed in Epic and correct as of today:  Health Maintenance   Topic Date Due     COVID-19 Vaccine (1 of 2) 01/19/1956     MEDICARE ANNUAL WELLNESS VISIT  01/19/2003     ZOSTER IMMUNIZATION (2 of 3) 04/29/2008     FALL RISK ASSESSMENT  10/23/2020     DTAP/TDAP/TD IMMUNIZATION (2 - Td) 05/21/2022     ADVANCE CARE PLANNING  10/23/2024     PHQ-2  Completed     INFLUENZA VACCINE  Completed     Pneumococcal Vaccine: 65+ Years  Completed     Pneumococcal Vaccine: Pediatrics (0 to 5 Years) and  "At-Risk Patients (6 to 64 Years)  Aged Out     IPV IMMUNIZATION  Aged Out     MENINGITIS IMMUNIZATION  Aged Out     HEPATITIS B IMMUNIZATION  Aged Out       Immunization History   Administered Date(s) Administered     Influenza (High Dose) 3 valent vaccine 10/08/2015, 10/03/2016, 10/03/2017, 10/19/2018     Pneumococcal 23 valent 11/01/1993, 10/25/2004, 05/21/2012     Td (Adult), Adsorbed 09/08/1986     Tdap (Adacel,Boostrix) 05/21/2012     Zoster vaccine, live 03/04/2008         ROS:  CONSTITUTIONAL: NEGATIVE for fever, chills, change in weight  EYES: NEGATIVE for vision changes or irritation  ENT/MOUTH: NEGATIVE for ear, mouth and throat problems  RESP: NEGATIVE for significant cough or SOB  CV: NEGATIVE for chest pain, palpitations or peripheral edema  GI: NEGATIVE for nausea, abdominal pain, heartburn, or change in bowel habits  : NEGATIVE for frequency, dysuria, or hematuria  MUSCULOSKELETAL: + for back pain, shoulder pain  NEURO: NEGATIVE for weakness, dizziness or paresthesias  HEME: NEGATIVE for bleeding problems  PSYCHIATRIC: NEGATIVE for changes in mood or affect    OBJECTIVE:   /82   Pulse 89   Temp 98.9  F (37.2  C) (Temporal)   Resp 16   Ht 1.753 m (5' 9\")   Wt 80.9 kg (178 lb 4.8 oz)   SpO2 94%   BMI 26.33 kg/m   Estimated body mass index is 25.65 kg/m  as calculated from the following:    Height as of 7/31/19: 1.753 m (5' 9\").    Weight as of 10/23/19: 78.8 kg (173 lb 11.2 oz).  EXAM:   GENERAL: alert and no distress  EYES: Eyes grossly normal to inspection, PERRL and conjunctivae and sclerae normal  HENT: deaf using ASL.  NECK: no adenopathy, no asymmetry, masses, or scars and thyroid normal to palpation  RESP: lungs clear to auscultation - no rales, rhonchi or wheezes  CV: regular rate and rhythm, normal S1 S2, no S3 or S4, no click or rub,  Noted trace edema  LLQ abdominal wall inguinal hernia, appears reducible.  There is no tenderness noted.  MS: no gross musculoskeletal defects " noted  NEURO: No focal changes using wheeled walker  PSYCH: mentation appears normal, affect normal/bright      ASSESSMENT / PLAN:   1. Encounter for Medicare annual wellness exam  Nitesh Covid vaccination and patient states is scheduled accordingly.  Discussed shingles vaccination.  - Hemoglobin; Future  - Comprehensive metabolic panel; Future  - Lipid Profile; Future    2. Hyperlipidemia LDL goal <130  Labs ordered as fasting per routine.  - Lipid Profile; Future    3. Age-related osteoporosis with current pathological fracture with routine healing, subsequent encounter  Noted history of Fosamax use thus suggest updated bone density scan  - alendronate (FOSAMAX) 70 MG tablet; Take 1 tablet (70 mg) by mouth every 7 days  Dispense: 12 tablet; Refill: 3  - OFFICE/OUTPT VISIT,EST,LEVL III  - DX Hip/Pelvis/Spine; Future    4. Deafness, unspecified laterality  Noted use of ASL patient seems to get along quite well.    5. Colon cancer screening  No further colonoscopies recommended but suggest stool kit for reassurance  - Fecal colorectal cancer screen (FIT); Future    6. Gastroesophageal reflux disease with esophagitis without hemorrhage  Continue with medications as directed.  - Multiple Vitamin (MULTIVITAMIN) TABS; Take 1 tablet by mouth daily  Dispense: 90 tablet; Refill: 3  - folic acid (FOLVITE) 1 MG tablet; Take 1 tablet (1 mg) by mouth daily  Dispense: 30 tablet; Refill: 0    7. Chronic low back pain, unspecified back pain laterality, unspecified whether sciatica present  Discussed options available suggest imaging x-ray low back to rule out compression fracture and start physical therapy pending results  - [facility] REFERRAL; Future  - OFFICE/OUTPT VISIT,EST,LEVL III  - XR Lumbar Spine 2/3 Views; Future    8. Chronic left shoulder pain  Appears to be musculoskeletal as is able to abduct shoulder better than internal rotate.  Discussed imaging for reassurance and physical therapy trial to  "start  - [facility], [facility]IR[facility]CTIC REFERRAL; Future  - OFFICE/OUTPT VISIT,EST,LEVL III  - XR Shoulder Left 2 Views; Future      9. Hernia of abdominal wall  Discussed hernia which appears to be fairly prominent but asymptomatic.  Offered surgical evaluation which I have suggested and recommended.  Patient was given referral number to call for appointment.  - GENERAL SURG ADULT REFERRAL; Future    Patient has been advised of split billing requirements and indicates understanding: Yes    COUNSELING:  Reviewed preventive health counseling, as reflected in patient instructions       Regular exercise       Healthy diet/nutrition    Estimated body mass index is 25.65 kg/m  as calculated from the following:    Height as of 7/31/19: 1.753 m (5' 9\").    Weight as of 10/23/19: 78.8 kg (173 lb 11.2 oz).        He reports that he has never smoked. He has never used smokeless tobacco.    Appropriate preventive services were discussed with this patient, including applicable screening as appropriate for cardiovascular disease, diabetes, osteopenia/osteoporosis, and glaucoma.  As appropriate for age/gender, discussed screening for colorectal cancer, prostate cancer, breast cancer, and cervical cancer. Checklist reviewing preventive services available has been given to the patient.    Reviewed patients plan of care and provided an AVS. The Basic Care Plan (routine screening as documented in Health Maintenance) for Michelle meets the Care Plan requirement. This Care Plan has been established and reviewed with the Patient.    Counseling Resources:  ATP IV Guidelines  Pooled Cohorts Equation Calculator  Breast Cancer Risk Calculator  BRCA-Related Cancer Risk Assessment: FHS-7 Tool  FRAX Risk Assessment  ICSI Preventive Guidelines  Dietary Guidelines for Americans, 2010  USDA's MyPlate  ASA Prophylaxis  Lung CA Screening    Tevin Glasgow MD  [facility]  "

## 2021-02-18 ENCOUNTER — ANCILLARY PROCEDURE (OUTPATIENT)
Dept: BONE DENSITY | Facility: CLINIC | Age: 81
End: 2021-02-18
Attending: INTERNAL MEDICINE
Payer: MEDICARE

## 2021-02-18 ENCOUNTER — ANCILLARY PROCEDURE (OUTPATIENT)
Dept: GENERAL RADIOLOGY | Facility: CLINIC | Age: 81
End: 2021-02-18
Attending: INTERNAL MEDICINE
Payer: MEDICARE

## 2021-02-18 DIAGNOSIS — M25.512 CHRONIC LEFT SHOULDER PAIN: ICD-10-CM

## 2021-02-18 DIAGNOSIS — M54.50 CHRONIC LOW BACK PAIN, UNSPECIFIED BACK PAIN LATERALITY, UNSPECIFIED WHETHER SCIATICA PRESENT: ICD-10-CM

## 2021-02-18 DIAGNOSIS — G89.29 CHRONIC LOW BACK PAIN, UNSPECIFIED BACK PAIN LATERALITY, UNSPECIFIED WHETHER SCIATICA PRESENT: ICD-10-CM

## 2021-02-18 DIAGNOSIS — G89.29 CHRONIC LEFT SHOULDER PAIN: ICD-10-CM

## 2021-02-18 DIAGNOSIS — E78.5 HYPERLIPIDEMIA LDL GOAL <130: ICD-10-CM

## 2021-02-18 DIAGNOSIS — M81.0 OSTEOPOROSIS, UNSPECIFIED OSTEOPOROSIS TYPE, UNSPECIFIED PATHOLOGICAL FRACTURE PRESENCE: ICD-10-CM

## 2021-02-18 DIAGNOSIS — Z00.00 ENCOUNTER FOR MEDICARE ANNUAL WELLNESS EXAM: ICD-10-CM

## 2021-02-18 LAB
ALBUMIN SERPL-MCNC: 3.5 G/DL (ref 3.4–5)
ALP SERPL-CCNC: 84 U/L (ref 40–150)
ALT SERPL W P-5'-P-CCNC: 22 U/L (ref 0–70)
ANION GAP SERPL CALCULATED.3IONS-SCNC: 1 MMOL/L (ref 3–14)
AST SERPL W P-5'-P-CCNC: 21 U/L (ref 0–45)
BILIRUB SERPL-MCNC: 0.6 MG/DL (ref 0.2–1.3)
BUN SERPL-MCNC: 20 MG/DL (ref 7–30)
CALCIUM SERPL-MCNC: 9.1 MG/DL (ref 8.5–10.1)
CHLORIDE SERPL-SCNC: 107 MMOL/L (ref 94–109)
CHOLEST SERPL-MCNC: 201 MG/DL
CO2 SERPL-SCNC: 31 MMOL/L (ref 20–32)
CREAT SERPL-MCNC: 1.06 MG/DL (ref 0.66–1.25)
GFR SERPL CREATININE-BSD FRML MDRD: 65 ML/MIN/{1.73_M2}
GLUCOSE SERPL-MCNC: 96 MG/DL (ref 70–99)
HDLC SERPL-MCNC: 50 MG/DL
HGB BLD-MCNC: 13 G/DL (ref 13.3–17.7)
LDLC SERPL CALC-MCNC: 130 MG/DL
NONHDLC SERPL-MCNC: 151 MG/DL
POTASSIUM SERPL-SCNC: 4.1 MMOL/L (ref 3.4–5.3)
PROT SERPL-MCNC: 9.2 G/DL (ref 6.8–8.8)
SODIUM SERPL-SCNC: 139 MMOL/L (ref 133–144)
TRIGL SERPL-MCNC: 106 MG/DL

## 2021-02-18 PROCEDURE — 80061 LIPID PANEL: CPT | Performed by: INTERNAL MEDICINE

## 2021-02-18 PROCEDURE — 72100 X-RAY EXAM L-S SPINE 2/3 VWS: CPT | Performed by: RADIOLOGY

## 2021-02-18 PROCEDURE — 77081 DXA BONE DENSITY APPENDICULR: CPT | Performed by: INTERNAL MEDICINE

## 2021-02-18 PROCEDURE — 80053 COMPREHEN METABOLIC PANEL: CPT | Performed by: INTERNAL MEDICINE

## 2021-02-18 PROCEDURE — 73030 X-RAY EXAM OF SHOULDER: CPT | Mod: LT | Performed by: RADIOLOGY

## 2021-02-18 PROCEDURE — 85018 HEMOGLOBIN: CPT | Performed by: INTERNAL MEDICINE

## 2021-02-18 PROCEDURE — 36415 COLL VENOUS BLD VENIPUNCTURE: CPT | Performed by: INTERNAL MEDICINE

## 2021-03-02 ENCOUNTER — THERAPY VISIT (OUTPATIENT)
Dept: PHYSICAL THERAPY | Facility: CLINIC | Age: 81
End: 2021-03-02
Attending: INTERNAL MEDICINE
Payer: MEDICARE

## 2021-03-02 DIAGNOSIS — M54.50 CHRONIC RIGHT-SIDED LOW BACK PAIN WITHOUT SCIATICA: ICD-10-CM

## 2021-03-02 DIAGNOSIS — G89.29 CHRONIC LOW BACK PAIN, UNSPECIFIED BACK PAIN LATERALITY, UNSPECIFIED WHETHER SCIATICA PRESENT: ICD-10-CM

## 2021-03-02 DIAGNOSIS — M54.50 CHRONIC LOW BACK PAIN, UNSPECIFIED BACK PAIN LATERALITY, UNSPECIFIED WHETHER SCIATICA PRESENT: ICD-10-CM

## 2021-03-02 DIAGNOSIS — G89.29 CHRONIC LEFT SHOULDER PAIN: ICD-10-CM

## 2021-03-02 DIAGNOSIS — M25.512 CHRONIC LEFT SHOULDER PAIN: ICD-10-CM

## 2021-03-02 DIAGNOSIS — G89.29 CHRONIC RIGHT-SIDED LOW BACK PAIN WITHOUT SCIATICA: ICD-10-CM

## 2021-03-02 PROCEDURE — 97161 PT EVAL LOW COMPLEX 20 MIN: CPT | Mod: GP | Performed by: PHYSICAL THERAPIST

## 2021-03-02 PROCEDURE — 97112 NEUROMUSCULAR REEDUCATION: CPT | Mod: GP | Performed by: PHYSICAL THERAPIST

## 2021-03-02 PROCEDURE — 97110 THERAPEUTIC EXERCISES: CPT | Mod: GP | Performed by: PHYSICAL THERAPIST

## 2021-03-02 NOTE — LETTER
DEPARTMENT OF HEALTH AND HUMAN SERVICES  CENTERS FOR MEDICARE & MEDICAID SERVICES    PLAN/UPDATED PLAN OF PROGRESS FOR OUTPATIENT REHABILITATION          PATIENTS NAME:  Michelle Wright   : 1940  PR[facility] NUMBER:    [id number]  [id number]   PROVIDER NAME: [facility]  MEDICAL RECORD NUMBER: [id number]   START OF CARE DATE: 21   TYPE:  PT  PRIMARY/TREATMENT DIAGNOSIS: Chronic low back pain, unspecified back pain laterality, unspecified whether sciatica present; Chronic left shoulder pain; Chronic right-sided low back pain without sciatica  VISITS FROM START OF CARE:  Rxs Used: 1     [place] [facility] Initial Evaluation  Subjective:  The history is provided by the patient. The history is limited by a language barrier. A  was used (Asset Mapping).   Patient Health History  Michelle Wright being seen for L shoulder and LBP.   Problem occurred: recurrent pain no specific injury   Pain is reported as 6/10 on pain scale.  General health as reported by patient is fair.  Pertinent medical history includes: history of fractures, osteoporosis and other (Pt is deaf, history of abdominal wall hernia).   Red flags:  Pain at rest/night.  Other medical allergies details: see EPIC.   Current occupation is Retired.   Primary job tasks include:  Prolonged sitting.              Therapist Generated HPI Evaluation  Problem details: Pt c/o LBP increasing over past few months.  Denies specific injury.  MD order date 2021.  Pt notes long standing inguinal hernia present, but has not worsened as LBP has increased..         Type of problem:  Lumbar and thoracic.  This is a chronic condition.  Condition occurred with:  Insidious onset.  Where condition occurred: for unknown reasons.  Patient reports pain:  Mid lumbar spine, upper lumbar spine, thoracic spine right, central thoracic spine and lumbar spine right.  Pain is described as aching, sharp,  shooting and cramping and is intermittent.  Pain radiates to:  No radiation. Pain is the same all the time.  Symptoms are exacerbated by bending, lying down, lifting, twisting, standing and walking  and relieved by rest.  Special tests included:  X-ray (-).  Previous treatment includes physical therapy (2 years ago with moderate improvement noted.  Fair ongoing HEP compliance). There was moderate improvement following previous treatment.  Barriers include:  None as reported by patient.          PATIENTS NAME:  Michelle Wright   : 1940          Therapist Generated HPI Evaluation  Problem details: Pt c/o L shoulder pain x 10+ years.  Denies current injury.  States symptoms have worsened over the past couple months. MD order date 2021..         Type of problem:  Left shoulder (some R shoulder pain occ as well).  This is a chronic condition.  Condition occurred with:  Unknown cause.  Where condition occurred: for unknown reasons.  Patient reports pain:  Anterior and lateral.  Pain is described as aching, shooting, stabbing and sharp and is intermittent.  Pain is the same all the time.  Since onset symptoms are unchanged.  Associated symptoms:  Loss of strength and loss of motion/stiffness. Symptoms are exacerbated by using arm at shoulder level, using arm behind back, using arm overhead, lying on extremity and lifting  and relieved by rest.  Special tests included:  X-ray (L ACJ DJD).  Barriers include:  None as reported by patient.                   Objective:  Standing Alignment:    Cervical/Thoracic:  Forward head and thoracic kyphosis increased  Lumbar:  Lordosis decr  Gait:    Gait Type:  Antalgic   Assistive Devices:  Walker  Deviations:  Lumbar:  Trunk flexion       Lumbar/SI Evaluation  ROM:  Arom wnl lumbar: ROM testing sitting due to balance issues.  AROM Lumbar:   Flexion:            Min/mod loss -  Ext:                    Max loss +   Side Bend:        Left:  Mod loss +/-    Right:  Mod loss  +/-  Rotation:           Left:  Mod loss +    Right:  Min/mod loss +/-  Side Glide:        Left:     Right:   Strength: fair core stab recruitment-poor postural awareness  Lumbar Palpation:    Tenderness present at Left:    Erector Spinae (Tx/Lx)       Shoulder Evaluation:  ROM:  AROM:    Flexion:  Left:  125    Right:  125  Abduction:  Left: 120   Right:  120  External Rotation:  Left:  25    Right:  35  Extension/Internal Rotation:  Left:  T10    Right:  T10    Strength:    Flexion: Left:4+/5    Pain: -/+    Right: 5-/5      Pain:  -  Extension:  Left: 5-/5     Pain:-    Right: 5-/5     Pain:-  Abduction:  Left: 4/5   Pain:+    Right: 5-/5      Pain:-  Adduction:  Left: 5-/5     Pain:-    Right: 5-/5      Pain:-  Internal Rotation:  Left:5-/5      Pain:-    Right: 5-/5      Pain:-  External Rotation:   Left:4/5      Pain:+   Right:5-/5      Pain:-    Horizontal Adduction:  Right:/5     Pain:-  PATIENTS NAME:  Michelle Wright   : 1940        Palpation:    Left shoulder tenderness present at:  Acrimioclavicular  Mobility Tests:    Scapulohumeral rhythm right:  Hypermobile  Scapulohumeral rhythm right:  Hypermobile         Assessment/Plan:    Patient is a 81 year old male with thoracic, lumbar and left side shoulder complaints.    Patient has the following significant findings with corresponding treatment plan.                Diagnosis 1:  Thoracolumbar pain and L shoulder pain  Pain -  hot/cold therapy, directional preference exercise and home program  Decreased ROM/flexibility - manual therapy and therapeutic exercise  Decreased joint mobility - manual therapy and therapeutic exercise  Decreased strength - therapeutic exercise and therapeutic activities  Decreased proprioception - neuro re-education and therapeutic activities  Impaired gait - gait training  Impaired muscle performance - neuro re-education  Decreased function - therapeutic activities  Impaired posture - neuro re-education    Therapy Evaluation  "Codes: Cumulative Therapy Evaluation is: Low complexity.    Previous and current functional limitations:  (See Goal Flow Sheet for this information)    Short term and Long term goals: (See Goal Flow Sheet for this information)   Communication ability:  Patient appears to be able to clearly communicate and understand verbal and written communication and follow directions correctly.  Treatment Explanation - The following has been discussed with the patient:   RX ordered/plan of care  This patient would benefit from PT intervention to resume normal activities.   Rehab potential is good.    Frequency:  1 X week, once daily  Duration:  for 8 weeks  Discharge Plan:  Achieve all LTG.  Independent in home treatment program.  Reach maximal therapeutic benefit.    Caregiver Signature/Credentials _____________________________ Date ________       Treating Provider: Bulmaro Koehler MPT     I have reviewed and certified the need for these services and plan of treatment while under my care.    PHYSICIAN'S SIGNATURE:   _________________________________________  Date___________   Tevin Glasgow MD    Certification period:  Beginning of Cert date period: 03/02/21 to  End of Cert period date: 05/30/21     Functional Level Progress Report: Please see attached \"Goal Flow sheet for Functional level.\"    ____X____ Continue Services or       ________ DC Services                Service dates: From  SOC Date: 03/02/21 date to present                     "

## 2021-03-02 NOTE — PROGRESS NOTES
[facility] Initial Evaluation  Subjective:  The history is provided by the patient. The history is limited by a language barrier. A  was used (ASL).   Patient Health History  Michelle Wright being seen for L shoulder and LBP.       Problem occurred: recurrent pain no specific injury   Pain is reported as 6/10 on pain scale.  General health as reported by patient is fair.  Pertinent medical history includes: history of fractures, osteoporosis and other (Pt is deaf, history of abdominal wall hernia).   Red flags:  Pain at rest/night.   Other medical allergies details: see EPIC.           Current occupation is Retired.   Primary job tasks include:  Prolonged sitting.                  Therapist Generated HPI Evaluation  Problem details: Pt c/o LBP increasing over past few months.  Denies specific injury.  MD order date 2/8/2021.  Pt notes long standing inguinal hernia present, but has not worsened as LBP has increased..         Type of problem:  Lumbar and thoracic.    This is a chronic condition.  Condition occurred with:  Insidious onset.  Where condition occurred: for unknown reasons.  Patient reports pain:  Mid lumbar spine, upper lumbar spine, thoracic spine right, central thoracic spine and lumbar spine right.  Pain is described as aching, sharp, shooting and cramping and is intermittent.  Pain radiates to:  No radiation. Pain is the same all the time.    Symptoms are exacerbated by bending, lying down, lifting, twisting, standing and walking  and relieved by rest.  Special tests included:  X-ray (-).  Previous treatment includes physical therapy (2 years ago with moderate improvement noted.  Fair ongoing HEP compliance). There was moderate improvement following previous treatment.  Barriers include:  None as reported by patient.    Therapist Generated HPI Evaluation  Problem details: Pt c/o L shoulder pain x 10+ years.  Denies current injury.  States symptoms have worsened  over the past couple months. MD order date 2/8/2021..         Type of problem:  Left shoulder (some R shoulder pain occ as well).    This is a chronic condition.  Condition occurred with:  Unknown cause.  Where condition occurred: for unknown reasons.  Patient reports pain:  Anterior and lateral.  Pain is described as aching, shooting, stabbing and sharp and is intermittent.  Pain is the same all the time.  Since onset symptoms are unchanged.  Associated symptoms:  Loss of strength and loss of motion/stiffness. Symptoms are exacerbated by using arm at shoulder level, using arm behind back, using arm overhead, lying on extremity and lifting  and relieved by rest.  Special tests included:  X-ray (L ACJ DJD).    Barriers include:  None as reported by patient.                        Objective:  Standing Alignment:    Cervical/Thoracic:  Forward head and thoracic kyphosis increased    Lumbar:  Lordosis decr            Gait:    Gait Type:  Antalgic   Assistive Devices:  Walker  Deviations:  Lumbar:  Trunk flexion               Lumbar/SI Evaluation  ROM:  Arom wnl lumbar: ROM testing sitting due to balance issues.  AROM Lumbar:   Flexion:            Min/mod loss -  Ext:                    Max loss +   Side Bend:        Left:  Mod loss +/-    Right:  Mod loss +/-  Rotation:           Left:  Mod loss +    Right:  Min/mod loss +/-  Side Glide:        Left:     Right:         Strength: fair core stab recruitment-poor postural awareness            Lumbar Palpation:    Tenderness present at Left:    Erector Spinae (Tx/Lx)                    Shoulder Evaluation:  ROM:  AROM:    Flexion:  Left:  125    Right:  125    Abduction:  Left: 120   Right:  120      External Rotation:  Left:  25    Right:  35            Extension/Internal Rotation:  Left:  T10    Right:  T10          Strength:    Flexion: Left:4+/5    Pain: -/+    Right: 5-/5      Pain:  -  Extension:  Left: 5-/5     Pain:-    Right: 5-/5     Pain:-  Abduction:  Left: 4/5    Pain:+    Right: 5-/5      Pain:-  Adduction:  Left: 5-/5     Pain:-    Right: 5-/5      Pain:-  Internal Rotation:  Left:5-/5      Pain:-    Right: 5-/5      Pain:-  External Rotation:   Left:4/5      Pain:+   Right:5-/5      Pain:-      Horizontal Adduction:  Right:/5     Pain:-          Palpation:    Left shoulder tenderness present at:  Acrimioclavicular    Mobility Tests:                Scapulohumeral rhythm right:  Hypermobile  Scapulohumeral rhythm right:  Hypermobile                                   General     ROS    Assessment/Plan:    Patient is a 81 year old male with thoracic, lumbar and left side shoulder complaints.    Patient has the following significant findings with corresponding treatment plan.                Diagnosis 1:  Thoracolumbar pain and L shoulder pain  Pain -  hot/cold therapy, directional preference exercise and home program  Decreased ROM/flexibility - manual therapy and therapeutic exercise  Decreased joint mobility - manual therapy and therapeutic exercise  Decreased strength - therapeutic exercise and therapeutic activities  Decreased proprioception - neuro re-education and therapeutic activities  Impaired gait - gait training  Impaired muscle performance - neuro re-education  Decreased function - therapeutic activities  Impaired posture - neuro re-education    Therapy Evaluation Codes:   Cumulative Therapy Evaluation is: Low complexity.    Previous and current functional limitations:  (See Goal Flow Sheet for this information)    Short term and Long term goals: (See Goal Flow Sheet for this information)     Communication ability:  Patient appears to be able to clearly communicate and understand verbal and written communication and follow directions correctly.  Treatment Explanation - The following has been discussed with the patient:   RX ordered/plan of care  Anticipated outcomes  Possible risks and side effects  This patient would benefit from PT intervention to resume normal  activities.   Rehab potential is good.    Frequency:  1 X week, once daily  Duration:  for 8 weeks  Discharge Plan:  Achieve all LTG.  Independent in home treatment program.  Reach maximal therapeutic benefit.    Please refer to the daily flowsheet for treatment today, total treatment time and time spent performing 1:1 timed codes.

## 2021-03-08 DIAGNOSIS — K21.00 GASTROESOPHAGEAL REFLUX DISEASE WITH ESOPHAGITIS WITHOUT HEMORRHAGE: ICD-10-CM

## 2021-03-09 RX ORDER — FOLIC ACID 1 MG/1
1 TABLET ORAL DAILY
Qty: 30 TABLET | Refills: 0 | Status: SHIPPED | OUTPATIENT
Start: 2021-03-09 | End: 2021-04-06

## 2021-04-06 DIAGNOSIS — K21.00 GASTROESOPHAGEAL REFLUX DISEASE WITH ESOPHAGITIS WITHOUT HEMORRHAGE: ICD-10-CM

## 2021-04-06 RX ORDER — FOLIC ACID 1 MG/1
1 TABLET ORAL DAILY
Qty: 30 TABLET | Refills: 10 | Status: SHIPPED | OUTPATIENT
Start: 2021-04-06 | End: 2022-03-04

## 2021-06-09 ENCOUNTER — THERAPY VISIT (OUTPATIENT)
Dept: PHYSICAL THERAPY | Facility: CLINIC | Age: 81
End: 2021-06-09
Payer: MEDICARE

## 2021-06-09 DIAGNOSIS — M54.50 CHRONIC RIGHT-SIDED LOW BACK PAIN WITHOUT SCIATICA: ICD-10-CM

## 2021-06-09 DIAGNOSIS — G89.29 CHRONIC LEFT SHOULDER PAIN: ICD-10-CM

## 2021-06-09 DIAGNOSIS — G89.29 CHRONIC RIGHT-SIDED LOW BACK PAIN WITHOUT SCIATICA: ICD-10-CM

## 2021-06-09 DIAGNOSIS — M25.512 CHRONIC LEFT SHOULDER PAIN: ICD-10-CM

## 2021-06-09 PROCEDURE — 97110 THERAPEUTIC EXERCISES: CPT | Mod: GP | Performed by: PHYSICAL THERAPIST

## 2021-06-09 PROCEDURE — 97112 NEUROMUSCULAR REEDUCATION: CPT | Mod: GP | Performed by: PHYSICAL THERAPIST

## 2021-06-09 NOTE — LETTER
DEPARTMENT OF HEALTH AND HUMAN SERVICES  CENTERS FOR MEDICARE & MEDICAID SERVICES    PLAN/UPDATED PLAN OF PROGRESS FOR OUTPATIENT REHABILITATION          PATIENTS NAME:  Michelle Wright   : 1940  PROVIDER NUMBER:  [id number]  HICN: [id number]  [facility] [place]  MEDICAL RECORD NUMBER: [id number]   START OF CARE DATE: 21   TYPE:  PT  PRIMARY/TREATMENT DIAGNOSIS: Chronic left shoulder pain  Chronic right-sided low back pain without sciatica  VISITS FROM START OF CARE:  Rxs Used: 2       PROGRESS  REPORT  Progress reporting period is from IE to 2021.       SUBJECTIVE  Subjective changes noted by patient: Subjective: Pt returns after break in PT due to scheduling issues and only fair HEP compliance.  R sided LBP better with HEP but still occ shooting pain with bed mobility and standing/walking.  L shoulder pain also improved, pain mostly with reaching/lifting    Current pain level is  Current Pain level: (LBP 1-4/10, L shoulder 0-4/10).     Previous pain level was   Initial Pain level: 5/10.   Changes in function:  Yes (See Goal flowsheet attached for changes in current functional level)  Adverse reaction to treatment or activity: None  Oswestry Score: 22.22 %                 OBJECTIVE  Changes noted in objective findings:    Objective: poor sitting and standing posture.  Gait: rolling walker flexed trunk with decreased step length.  Lx ROM: flex min loss -, ext max loss +.  Fair/poor core stab recruitiment.  L shoulder AROM: flex 120 +, abd 120 +, ER 25+.  MMT shoulder ER 4/5 +/-, abd 4+/5 +/-.  Poor scap stab-UT over recruitment     ASSESSMENT/PLAN  Updated problem list and treatment plan: Diagnosis 1:  LBP  Pain -  hot/cold therapy, directional preference exercise and home program  Decreased ROM/flexibility - manual therapy and therapeutic exercise  Decreased strength - therapeutic exercise and therapeutic activities  Decreased proprioception -  "neuro re-education and therapeutic activities  Impaired gait - gait training  Impaired muscle performance - neuro re-education  Decreased function - therapeutic activities  Impaired posture - neuro re-education  Diagnosis 2:  L shoulder pain   Pain -  hot/cold therapy and home program  Decreased ROM/flexibility - manual therapy and therapeutic exercise  Decreased joint mobility - manual therapy and therapeutic exercise  Decreased strength - therapeutic exercise and therapeutic activities  Impaired muscle performance - neuro re-education  Decreased function - therapeutic activities  Impaired posture - neuro re-education  STG/LTGs have been met or progress has been made towards goals:  Yes (See Goal flow sheet completed today.)  PATIENTS NAME:  Michelle Wright   : 1940          Assessment of Progress: The patient's condition is improving.  The patient's condition has potential to improve.  Self Management Plans:  Patient has been instructed in a home treatment program.  Patient  has been instructed in self management of symptoms.  I have re-evaluated this patient and find that the nature, scope, duration and intensity of the therapy is appropriate for the medical condition of the patient.  Michelle continues to require the following intervention to meet STG and LTG's:  PT    Recommendations:  This patient would benefit from continued therapy.     Frequency:  1 X week, once daily  Duration:  for 8 weeks    Caregiver Signature/Credentials _____________________________ Date ________       Treating Provider:  Bulmaro Koehler, MPT    I have reviewed and certified the need for these services and plan of treatment while under my care.        PHYSICIAN'S SIGNATURE:   _________________________________________  Date___________   Tevin Glasgow MD    Certification period:  Beginning of Cert date period: 21 to  End of Cert period date: 21     Functional Level Progress Report: Please see attached \"Goal Flow " "sheet for Functional level.\"    ____X____ Continue Services or       ________ DC Services                Service dates: From  SOC Date: 03/02/21 date to present                         "

## 2021-06-09 NOTE — PROGRESS NOTES
Subjective:  HPI  Physical Exam  Oswestry Score: 22.22 %                 Objective:  System    Physical Exam    General     ROS    Assessment/Plan:    PROGRESS  REPORT    Progress reporting period is from IE to 6/9/2021.       SUBJECTIVE  Subjective changes noted by patient:  .  Subjective: Pt returns after break in PT due to scheduling issues and only fair HEP compliance.  R sided LBP better with HEP but still occ shooting pain with bed mobility and standing/walking.  L shoulder pain also improved, pain mostly with reaching/lifting    Current pain level is  Current Pain level: (LBP 1-4/10, L shoulder 0-4/10).     Previous pain level was   Initial Pain level: 5/10.   Changes in function:  Yes (See Goal flowsheet attached for changes in current functional level)  Adverse reaction to treatment or activity: None    OBJECTIVE  Changes noted in objective findings:    Objective: poor sitting and standing posture.  Gait: rolling walker flexed trunk with decreased step length.  Lx ROM: flex min loss -, ext max loss +.  Fair/poor core stab recruitiment.  L shoulder AROM: flex 120 +, abd 120 +, ER 25+.  MMT shoulder ER 4/5 +/-, abd 4+/5 +/-.  Poor scap stab-UT over recruitment     ASSESSMENT/PLAN  Updated problem list and treatment plan: Diagnosis 1:  LBP  Pain -  hot/cold therapy, directional preference exercise and home program  Decreased ROM/flexibility - manual therapy and therapeutic exercise  Decreased strength - therapeutic exercise and therapeutic activities  Decreased proprioception - neuro re-education and therapeutic activities  Impaired gait - gait training  Impaired muscle performance - neuro re-education  Decreased function - therapeutic activities  Impaired posture - neuro re-education  Diagnosis 2:  L shoulder pain   Pain -  hot/cold therapy and home program  Decreased ROM/flexibility - manual therapy and therapeutic exercise  Decreased joint mobility - manual therapy and therapeutic exercise  Decreased  strength - therapeutic exercise and therapeutic activities  Impaired muscle performance - neuro re-education  Decreased function - therapeutic activities  Impaired posture - neuro re-education  STG/LTGs have been met or progress has been made towards goals:  Yes (See Goal flow sheet completed today.)  Assessment of Progress: The patient's condition is improving.  The patient's condition has potential to improve.  Self Management Plans:  Patient has been instructed in a home treatment program.  Patient  has been instructed in self management of symptoms.  I have re-evaluated this patient and find that the nature, scope, duration and intensity of the therapy is appropriate for the medical condition of the patient.  Michelle continues to require the following intervention to meet STG and LTG's:  PT    Recommendations:  This patient would benefit from continued therapy.     Frequency:  1 X week, once daily  Duration:  for 8 weeks        Please refer to the daily flowsheet for treatment today, total treatment time and time spent performing 1:1 timed codes.

## 2021-08-12 ENCOUNTER — THERAPY VISIT (OUTPATIENT)
Dept: PHYSICAL THERAPY | Facility: CLINIC | Age: 81
End: 2021-08-12
Payer: MEDICARE

## 2021-08-12 DIAGNOSIS — M25.512 CHRONIC LEFT SHOULDER PAIN: ICD-10-CM

## 2021-08-12 DIAGNOSIS — G89.29 CHRONIC RIGHT-SIDED LOW BACK PAIN WITHOUT SCIATICA: ICD-10-CM

## 2021-08-12 DIAGNOSIS — M54.50 CHRONIC RIGHT-SIDED LOW BACK PAIN WITHOUT SCIATICA: ICD-10-CM

## 2021-08-12 DIAGNOSIS — G89.29 CHRONIC LEFT SHOULDER PAIN: ICD-10-CM

## 2021-08-12 PROCEDURE — 97110 THERAPEUTIC EXERCISES: CPT | Mod: GP | Performed by: PHYSICAL THERAPIST

## 2021-08-12 PROCEDURE — 97112 NEUROMUSCULAR REEDUCATION: CPT | Mod: GP | Performed by: PHYSICAL THERAPIST

## 2021-08-19 ENCOUNTER — THERAPY VISIT (OUTPATIENT)
Dept: PHYSICAL THERAPY | Facility: CLINIC | Age: 81
End: 2021-08-19
Payer: MEDICARE

## 2021-08-19 DIAGNOSIS — G89.29 CHRONIC RIGHT-SIDED LOW BACK PAIN WITHOUT SCIATICA: ICD-10-CM

## 2021-08-19 DIAGNOSIS — M25.512 CHRONIC LEFT SHOULDER PAIN: ICD-10-CM

## 2021-08-19 DIAGNOSIS — M54.50 CHRONIC RIGHT-SIDED LOW BACK PAIN WITHOUT SCIATICA: ICD-10-CM

## 2021-08-19 DIAGNOSIS — G89.29 CHRONIC LEFT SHOULDER PAIN: ICD-10-CM

## 2021-08-19 PROCEDURE — 97112 NEUROMUSCULAR REEDUCATION: CPT | Mod: GP | Performed by: PHYSICAL THERAPIST

## 2021-08-19 PROCEDURE — 97110 THERAPEUTIC EXERCISES: CPT | Mod: GP | Performed by: PHYSICAL THERAPIST

## 2021-08-26 ENCOUNTER — THERAPY VISIT (OUTPATIENT)
Dept: PHYSICAL THERAPY | Facility: CLINIC | Age: 81
End: 2021-08-26
Payer: MEDICARE

## 2021-08-26 DIAGNOSIS — G89.29 CHRONIC RIGHT-SIDED LOW BACK PAIN WITHOUT SCIATICA: ICD-10-CM

## 2021-08-26 DIAGNOSIS — M25.512 CHRONIC LEFT SHOULDER PAIN: ICD-10-CM

## 2021-08-26 DIAGNOSIS — G89.29 CHRONIC LEFT SHOULDER PAIN: ICD-10-CM

## 2021-08-26 DIAGNOSIS — M54.50 CHRONIC RIGHT-SIDED LOW BACK PAIN WITHOUT SCIATICA: ICD-10-CM

## 2021-08-26 PROCEDURE — 97110 THERAPEUTIC EXERCISES: CPT | Mod: GP | Performed by: PHYSICAL THERAPIST

## 2021-08-26 PROCEDURE — 97112 NEUROMUSCULAR REEDUCATION: CPT | Mod: GP | Performed by: PHYSICAL THERAPIST

## 2021-08-26 NOTE — PROGRESS NOTES
Subjective:  HPI  Physical Exam  Oswestry Score: 20 %                 Objective:  System    Physical Exam    General     ROS    Assessment/Plan:    DISCHARGE REPORT    Progress reporting period is from IE to 8/26/2021.       SUBJECTIVE  Subjective changes noted by patient:  .  Subjective: Doing well, more compliant with HEP and pain lessening    Current pain level is  Current Pain level:  (1-2/10).     Previous pain level was   Initial Pain level: 5/10.   Changes in function:  Yes (See Goal flowsheet attached for changes in current functional level)  Adverse reaction to treatment or activity: None    OBJECTIVE  Changes noted in objective findings:    Objective: Improved postural awareness without cuing. AROM: L shoulder flex 120, abd 125, ER 30+/-.  Lx ROM: flex ERT, ext mod/max loss +/-.  MMT: L shoulder grossly 4+/5 -.     ASSESSMENT/PLAN  Updated problem list and treatment plan: Diagnosis 1:  LBP and L shoulder pain  Pain -  home program  Decreased ROM/flexibility - home program  Decreased strength - home program  Decreased proprioception - home program  Impaired gait - home program  Impaired muscle performance - home program  Decreased function - home program  Impaired posture - home program  STG/LTGs have been met or progress has been made towards goals:  Yes (See Goal flow sheet completed today.)  Assessment of Progress: The patient's condition is improving.  Self Management Plans:  Patient is independent in a home treatment program.  Patient is independent in self management of symptoms.  I have re-evaluated this patient and find that the nature, scope, duration and intensity of the therapy is appropriate for the medical condition of the patient.  Michelle continues to require the following intervention to meet STG and LTG's:  PT intervention is no longer required to meet STG/LTG.    Recommendations:  This patient is ready to be discharged from therapy and continue their home treatment program.    Please refer to  the daily flowsheet for treatment today, total treatment time and time spent performing 1:1 timed codes.

## 2021-10-10 ENCOUNTER — HEALTH MAINTENANCE LETTER (OUTPATIENT)
Age: 81
End: 2021-10-10

## 2022-01-31 DIAGNOSIS — K21.00 GASTROESOPHAGEAL REFLUX DISEASE WITH ESOPHAGITIS WITHOUT HEMORRHAGE: ICD-10-CM

## 2022-01-31 RX ORDER — MULTIVITAMIN
TABLET ORAL
Qty: 90 TABLET | Refills: 0 | Status: SHIPPED | OUTPATIENT
Start: 2022-01-31 | End: 2022-05-11

## 2022-02-09 DIAGNOSIS — M80.00XD AGE-RELATED OSTEOPOROSIS WITH CURRENT PATHOLOGICAL FRACTURE WITH ROUTINE HEALING, SUBSEQUENT ENCOUNTER: ICD-10-CM

## 2022-02-10 RX ORDER — ALENDRONATE SODIUM 70 MG/1
70 TABLET ORAL
Qty: 12 TABLET | Refills: 0 | Status: SHIPPED | OUTPATIENT
Start: 2022-02-10 | End: 2022-04-14

## 2022-02-10 NOTE — TELEPHONE ENCOUNTER
Medication filled one time only as pt is due for a follow-up for further refills.  Pharmacy has been notified to inform patient to call clinic and schedule appointment.  Kina Whitmore RN

## 2022-02-11 DIAGNOSIS — M80.00XD AGE-RELATED OSTEOPOROSIS WITH CURRENT PATHOLOGICAL FRACTURE WITH ROUTINE HEALING, SUBSEQUENT ENCOUNTER: ICD-10-CM

## 2022-02-13 RX ORDER — ALENDRONATE SODIUM 70 MG/1
TABLET ORAL
Qty: 12 TABLET | Refills: 0 | OUTPATIENT
Start: 2022-02-13

## 2022-03-04 DIAGNOSIS — K21.00 GASTROESOPHAGEAL REFLUX DISEASE WITH ESOPHAGITIS WITHOUT HEMORRHAGE: ICD-10-CM

## 2022-03-04 RX ORDER — FOLIC ACID 1 MG/1
TABLET ORAL
Qty: 30 TABLET | Refills: 0 | Status: SHIPPED | OUTPATIENT
Start: 2022-03-04 | End: 2022-03-30

## 2022-03-04 NOTE — TELEPHONE ENCOUNTER
Prescription approved per [facility] Refill Protocol.  Suleiman Sagastume RN  [facility] Triage Nurse

## 2022-03-26 ENCOUNTER — HEALTH MAINTENANCE LETTER (OUTPATIENT)
Age: 82
End: 2022-03-26

## 2022-03-30 ENCOUNTER — OFFICE VISIT (OUTPATIENT)
Dept: INTERNAL MEDICINE | Facility: CLINIC | Age: 82
End: 2022-03-30
Payer: MEDICARE

## 2022-03-30 VITALS
TEMPERATURE: 98.1 F | DIASTOLIC BLOOD PRESSURE: 76 MMHG | HEART RATE: 88 BPM | WEIGHT: 171.5 LBS | SYSTOLIC BLOOD PRESSURE: 128 MMHG | HEIGHT: 69 IN | BODY MASS INDEX: 25.4 KG/M2 | RESPIRATION RATE: 16 BRPM | OXYGEN SATURATION: 96 %

## 2022-03-30 DIAGNOSIS — E78.5 HYPERLIPIDEMIA LDL GOAL <130: ICD-10-CM

## 2022-03-30 DIAGNOSIS — R21 RASH: ICD-10-CM

## 2022-03-30 DIAGNOSIS — M25.512 CHRONIC LEFT SHOULDER PAIN: ICD-10-CM

## 2022-03-30 DIAGNOSIS — Z12.11 COLON CANCER SCREENING: ICD-10-CM

## 2022-03-30 DIAGNOSIS — M54.50 CHRONIC LOW BACK PAIN, UNSPECIFIED BACK PAIN LATERALITY, UNSPECIFIED WHETHER SCIATICA PRESENT: ICD-10-CM

## 2022-03-30 DIAGNOSIS — G89.29 CHRONIC LOW BACK PAIN, UNSPECIFIED BACK PAIN LATERALITY, UNSPECIFIED WHETHER SCIATICA PRESENT: ICD-10-CM

## 2022-03-30 DIAGNOSIS — Z12.5 SCREENING PSA (PROSTATE SPECIFIC ANTIGEN): ICD-10-CM

## 2022-03-30 DIAGNOSIS — K21.00 GASTROESOPHAGEAL REFLUX DISEASE WITH ESOPHAGITIS WITHOUT HEMORRHAGE: ICD-10-CM

## 2022-03-30 DIAGNOSIS — G89.29 CHRONIC LEFT SHOULDER PAIN: ICD-10-CM

## 2022-03-30 DIAGNOSIS — M80.00XD AGE-RELATED OSTEOPOROSIS WITH CURRENT PATHOLOGICAL FRACTURE WITH ROUTINE HEALING, SUBSEQUENT ENCOUNTER: ICD-10-CM

## 2022-03-30 DIAGNOSIS — Z00.00 ENCOUNTER FOR SUBSEQUENT ANNUAL WELLNESS VISIT IN MEDICARE PATIENT: Primary | ICD-10-CM

## 2022-03-30 LAB
ERYTHROCYTE [DISTWIDTH] IN BLOOD BY AUTOMATED COUNT: 13.2 % (ref 10–15)
HCT VFR BLD AUTO: 49.6 % (ref 40–53)
HGB BLD-MCNC: 16.2 G/DL (ref 13.3–17.7)
MCH RBC QN AUTO: 35.4 PG (ref 26.5–33)
MCHC RBC AUTO-ENTMCNC: 32.7 G/DL (ref 31.5–36.5)
MCV RBC AUTO: 108 FL (ref 78–100)
PLATELET # BLD AUTO: 166 10E3/UL (ref 150–450)
RBC # BLD AUTO: 4.58 10E6/UL (ref 4.4–5.9)
WBC # BLD AUTO: 3.8 10E3/UL (ref 4–11)

## 2022-03-30 PROCEDURE — 99213 OFFICE O/P EST LOW 20 MIN: CPT | Mod: 25 | Performed by: INTERNAL MEDICINE

## 2022-03-30 PROCEDURE — 80053 COMPREHEN METABOLIC PANEL: CPT | Performed by: INTERNAL MEDICINE

## 2022-03-30 PROCEDURE — 80061 LIPID PANEL: CPT | Performed by: INTERNAL MEDICINE

## 2022-03-30 PROCEDURE — G0439 PPPS, SUBSEQ VISIT: HCPCS | Performed by: INTERNAL MEDICINE

## 2022-03-30 PROCEDURE — 36415 COLL VENOUS BLD VENIPUNCTURE: CPT | Performed by: INTERNAL MEDICINE

## 2022-03-30 PROCEDURE — 85027 COMPLETE CBC AUTOMATED: CPT | Performed by: INTERNAL MEDICINE

## 2022-03-30 PROCEDURE — T1013 SIGN LANG/ORAL INTERPRETER: HCPCS | Mod: U3

## 2022-03-30 RX ORDER — FOLIC ACID 1 MG/1
1 TABLET ORAL DAILY
Qty: 90 TABLET | Refills: 0 | Status: SHIPPED | OUTPATIENT
Start: 2022-03-30 | End: 2022-06-23

## 2022-03-30 RX ORDER — TRIAMCINOLONE ACETONIDE 1 MG/G
CREAM TOPICAL
Qty: 45 G | Refills: 2 | Status: SHIPPED | OUTPATIENT
Start: 2022-03-30 | End: 2023-01-01

## 2022-03-30 RX ORDER — PROPYLENE GLYCOL 0.06 MG/ML
SOLUTION/ DROPS OPHTHALMIC
COMMUNITY
Start: 2021-09-22

## 2022-03-30 ASSESSMENT — ENCOUNTER SYMPTOMS
ARTHRALGIAS: 1
HEMATOCHEZIA: 0
ABDOMINAL PAIN: 0
EYE PAIN: 0
NERVOUS/ANXIOUS: 0
HEMATURIA: 0
COUGH: 1
SORE THROAT: 0
FREQUENCY: 1
DIZZINESS: 0
HEARTBURN: 0
WEAKNESS: 0
JOINT SWELLING: 1
PARESTHESIAS: 0
PALPITATIONS: 0
MYALGIAS: 1
NAUSEA: 0
CHILLS: 0
CONSTIPATION: 1
HEADACHES: 0
DYSURIA: 0
SHORTNESS OF BREATH: 0
DIARRHEA: 0
FEVER: 0

## 2022-03-30 ASSESSMENT — ACTIVITIES OF DAILY LIVING (ADL)
CURRENT_FUNCTION: PREPARING MEALS REQUIRES ASSISTANCE
CURRENT_FUNCTION: HOUSEWORK REQUIRES ASSISTANCE

## 2022-03-30 NOTE — PROGRESS NOTES
SUBJECTIVE:   Michelle Wright is a 82 year old male who presents for Preventive Visit.    Patient has been advised of split billing requirements and indicates understanding: Yes  Are you in the first 12 months of your Medicare coverage?  No    Michelle is here today with his nurse as well as the his signing .    HPI  Do you feel safe in your environment? Yes    Have you ever done Advance Care Planning? (For example, a Health Directive, POLST, or a discussion with a medical provider or your loved ones about your wishes): Yes, advance care planning is on file.    Fall risk: low  Fallen 2 or more times in the past year?: No  Any fall with injury in the past year?: No    Cognitive Screening:  normal    Do you have sleep apnea, excessive snoring or daytime drowsiness?: no    Reviewed and updated as needed this visit by clinical staff                  Reviewed and updated as needed this visit by Provider                 Social History     Tobacco Use     Smoking status: Never Smoker     Smokeless tobacco: Never Used   Substance Use Topics     Alcohol use: Yes     Comment: 1-2 per week         Alcohol Use 2/6/2021   Prescreen: >3 drinks/day or >7 drinks/week? No       Current providers sharing in care for this patient include:   Patient Care Team:  Tevin Glasgow MD as PCP - General (Internal Medicine)  Tevin Glasgow MD as Assigned PCP    The following health maintenance items are reviewed in Epic and correct as of today:  Health Maintenance Due   Topic Date Due     ANNUAL REVIEW OF  ORDERS  Never done     ZOSTER IMMUNIZATION (2 of 3) 04/29/2008     PHQ-2 (once per calendar year)  01/01/2022     MEDICARE ANNUAL WELLNESS VISIT  02/08/2022     FALL RISK ASSESSMENT  02/08/2022       Immunization History   Administered Date(s) Administered     COVID-19,PF,Moderna 02/12/2021, 03/12/2021, 11/22/2021     FLUAD(HD)65+ QUAD 09/13/2021     Influenza (High Dose) 3 valent vaccine 10/08/2015, 10/03/2016, 10/03/2017,  "10/19/2018, 10/01/2020     Pneumococcal 23 valent 11/01/1993, 10/25/2004, 05/21/2012     Td (Adult), Adsorbed 09/08/1986     Tdap (Adacel,Boostrix) 05/21/2012     Zoster vaccine, live 03/04/2008         Review of Systems  CONSTITUTIONAL: NEGATIVE for fever, chills, change in weight  EYES: NEGATIVE for vision changes or irritation  ENT/MOUTH: NEGATIVE for ear, mouth and throat problems  RESP: NEGATIVE for significant cough or SOB  CV: NEGATIVE for chest pain, palpitations or peripheral edema  GI: NEGATIVE for nausea, abdominal pain, heartburn, or change in bowel habits  : NEGATIVE for frequency, dysuria, or hematuria  NEURO: NEGATIVE for weakness, dizziness or paresthesias  HEME: NEGATIVE for bleeding problems  PSYCHIATRIC: NEGATIVE for changes in mood or affect    He has had some ongoing issues with his left shoulder as well as his right low back.  He reports that these are intermittently bothersome.  He is interested in conservative evaluation.    He also has a history of reflux and has been taking his medication with no current heartburn.  He is wondering about the benefit of repeating an upper endoscopy as he has had this in the past with the potential of Atkinson's esophagus.  Of note he has a family history of such    OBJECTIVE:   /76   Pulse 88   Temp 98.1  F (36.7  C) (Tympanic)   Resp 16   Ht 1.753 m (5' 9\")   Wt 77.8 kg (171 lb 8 oz)   SpO2 96%   BMI 25.33 kg/m   Estimated body mass index is 26.33 kg/m  as calculated from the following:    Height as of 2/8/21: 1.753 m (5' 9\").    Weight as of 2/8/21: 80.9 kg (178 lb 4.8 oz).     Physical Exam  GENERAL: healthy, alert and no distress  Able to communicate via signing  EYES: Eyes grossly normal to inspection, PERRL and conjunctivae and sclerae normal, post cataract changes  HENT: ear canals and TM's normal, nose and mouth without ulcers or lesions  NECK: no adenopathy, no asymmetry, masses, or scars and thyroid normal to palpation  RESP: lungs " clear to auscultation - no rales, rhonchi or wheezes  CV: regular rate and rhythm, normal S1 S2, no S3 or S4, no click or rub, no peripheral edema and peripheral pulses strong  ABDOMEN: soft, nontender, no hepatosplenomegaly, no masses and bowel sounds normal  MS: no gross musculoskeletal defects noted  NEURO: No focal changes  PSYCH: mentation appears normal, affect normal/bright      ASSESSMENT / PLAN:   (Z00.00) Encounter for subsequent annual wellness visit in Medicare patient  (primary encounter diagnosis)  Comment: Advised patient consider updating shingles vaccination.  Plan: CBC with platelets, Comprehensive metabolic         panel, Lipid Profile            (E78.5) Hyperlipidemia LDL goal <130  Comment: Labs ordered as fasting per routine  Plan: Lipid Profile            (M80.00XD) Age-related osteoporosis with current pathological fracture with routine healing, subsequent encounter  Comment: Noted prior imaging studies and x-rays with significant number of T12-L5 compression fractures.  Currently on Fosamax.  Plan:     (Z12.11) Colon cancer screening  Comment: Advised FIT testing.  Plan: Fecal colorectal cancer screen FIT, Fecal         colorectal cancer screen (FIT)            (Z12.5) Screening PSA (prostate specific antigen)  Comment: Discussed deferring ongoing PSA screening  Plan:     (M54.50,  G89.29) Chronic low back pain, unspecified back pain laterality, unspecified whether sciatica present  Comment: Start trial of physical therapy.  Plan: Physical Therapy Referral            (M25.512,  G89.29) Chronic left shoulder pain  Comment: Start trial of physical therapy.  Plan: Physical Therapy Referral, OFFICE/OUTPT         VISIT,EST,LEVL IV            (K21.00) Gastroesophageal reflux disease with esophagitis without hemorrhage  Comment: with barretts  Plan: folic acid (FOLVITE) 1 MG tablet, Adult Gastro         Ref - Consult Only, OFFICE/OUTPT VISIT,EST,LEVL        IV        Discussed with patient that  "would selectively benefit from potential upper endoscopy.  Suggest patient may benefit from GI assessment to determine timing of repeat endoscopy based on chronic Fosamax use and reflux.    (R21) Rash  Comment: Refilled per patient request.  Plan: triamcinolone (KENALOG) 0.1 % external cream              Patient has been advised of split billing requirements and indicates understanding: Yes    COUNSELING:  Reviewed preventive health counseling, as reflected in patient instructions       Regular exercise       Healthy diet/nutrition    Estimated body mass index is 26.33 kg/m  as calculated from the following:    Height as of 2/8/21: 1.753 m (5' 9\").    Weight as of 2/8/21: 80.9 kg (178 lb 4.8 oz).    He reports that he has never smoked. He has never used smokeless tobacco.    Appropriate preventive services were discussed with this patient, including applicable screening as appropriate for cardiovascular disease, diabetes, osteopenia/osteoporosis, and glaucoma.  As appropriate for age/gender, discussed screening for colorectal cancer, prostate cancer, breast cancer, and cervical cancer. Checklist reviewing preventive services available has been given to the patient.    Reviewed patients plan of care and provided an AVS. The Basic Care Plan (routine screening as documented in Health Maintenance) for Michelle meets the Care Plan requirement. This Care Plan has been established and reviewed with the Patient.    Counseling Resources:  ATP IV Guidelines  Pooled Cohorts Equation Calculator  Breast Cancer Risk Calculator  Breast Cancer: Medication to Reduce Risk  FRAX Risk Assessment  ICSI Preventive Guidelines  Dietary Guidelines for Americans, 2010  EQAL's MyPlate  ASA Prophylaxis  Lung CA Screening    Tevin Glasgow MD  [facility]    Identified Health Risks:  "

## 2022-03-31 LAB
ALBUMIN SERPL-MCNC: 3.8 G/DL (ref 3.4–5)
ALP SERPL-CCNC: 81 U/L (ref 40–150)
ALT SERPL W P-5'-P-CCNC: 24 U/L (ref 0–70)
ANION GAP SERPL CALCULATED.3IONS-SCNC: 5 MMOL/L (ref 3–14)
AST SERPL W P-5'-P-CCNC: 22 U/L (ref 0–45)
BILIRUB SERPL-MCNC: 0.6 MG/DL (ref 0.2–1.3)
BUN SERPL-MCNC: 20 MG/DL (ref 7–30)
CALCIUM SERPL-MCNC: 9.2 MG/DL (ref 8.5–10.1)
CHLORIDE BLD-SCNC: 104 MMOL/L (ref 94–109)
CHOLEST SERPL-MCNC: 221 MG/DL
CO2 SERPL-SCNC: 28 MMOL/L (ref 20–32)
CREAT SERPL-MCNC: 0.99 MG/DL (ref 0.66–1.25)
FASTING STATUS PATIENT QL REPORTED: YES
GFR SERPL CREATININE-BSD FRML MDRD: 75 ML/MIN/1.73M2
GLUCOSE BLD-MCNC: 106 MG/DL (ref 70–99)
HDLC SERPL-MCNC: 55 MG/DL
LDLC SERPL CALC-MCNC: 148 MG/DL
NONHDLC SERPL-MCNC: 166 MG/DL
POTASSIUM BLD-SCNC: 4.6 MMOL/L (ref 3.4–5.3)
PROT SERPL-MCNC: 9.2 G/DL (ref 6.8–8.8)
SODIUM SERPL-SCNC: 137 MMOL/L (ref 133–144)
TRIGL SERPL-MCNC: 88 MG/DL

## 2022-04-13 DIAGNOSIS — M80.00XD AGE-RELATED OSTEOPOROSIS WITH CURRENT PATHOLOGICAL FRACTURE WITH ROUTINE HEALING, SUBSEQUENT ENCOUNTER: ICD-10-CM

## 2022-04-14 RX ORDER — ALENDRONATE SODIUM 70 MG/1
TABLET ORAL
Qty: 12 TABLET | Refills: 3 | Status: SHIPPED | OUTPATIENT
Start: 2022-04-14 | End: 2023-01-01

## 2022-04-14 NOTE — TELEPHONE ENCOUNTER
Prescription approved per [facility] Refill Protocol.  Kina Whitmore, RN  [facility] Triage Nurse

## 2022-05-10 DIAGNOSIS — K21.00 GASTROESOPHAGEAL REFLUX DISEASE WITH ESOPHAGITIS WITHOUT HEMORRHAGE: ICD-10-CM

## 2022-05-11 RX ORDER — MULTIVITAMIN
TABLET ORAL
Qty: 90 TABLET | Refills: 2 | Status: SHIPPED | OUTPATIENT
Start: 2022-05-11 | End: 2023-01-01

## 2022-05-11 NOTE — TELEPHONE ENCOUNTER
Prescription approved per [facility] Refill Protocol.    Mabel Esparza RN

## 2022-06-23 DIAGNOSIS — K21.00 GASTROESOPHAGEAL REFLUX DISEASE WITH ESOPHAGITIS WITHOUT HEMORRHAGE: ICD-10-CM

## 2022-06-23 RX ORDER — FOLIC ACID 1 MG/1
TABLET ORAL
Qty: 90 TABLET | Refills: 2 | Status: SHIPPED | OUTPATIENT
Start: 2022-06-23 | End: 2023-01-01

## 2022-06-23 NOTE — TELEPHONE ENCOUNTER
Prescription approved per [facility] Refill Protocol.  Suleiman Sagastume RN  [facility] Triage Nurse

## 2022-09-18 ENCOUNTER — HEALTH MAINTENANCE LETTER (OUTPATIENT)
Age: 82
End: 2022-09-18

## 2023-01-01 ENCOUNTER — ANCILLARY PROCEDURE (OUTPATIENT)
Dept: GENERAL RADIOLOGY | Facility: CLINIC | Age: 83
End: 2023-01-01
Attending: INTERNAL MEDICINE
Payer: MEDICARE

## 2023-01-01 ENCOUNTER — THERAPY VISIT (OUTPATIENT)
Dept: PHYSICAL THERAPY | Facility: CLINIC | Age: 83
End: 2023-01-01
Payer: MEDICARE

## 2023-01-01 ENCOUNTER — VIRTUAL VISIT (OUTPATIENT)
Dept: INTERNAL MEDICINE | Facility: CLINIC | Age: 83
End: 2023-01-01
Payer: MEDICARE

## 2023-01-01 ENCOUNTER — TELEPHONE (OUTPATIENT)
Dept: INTERNAL MEDICINE | Facility: CLINIC | Age: 83
End: 2023-01-01

## 2023-01-01 ENCOUNTER — HEALTH MAINTENANCE LETTER (OUTPATIENT)
Age: 83
End: 2023-01-01

## 2023-01-01 ENCOUNTER — OFFICE VISIT (OUTPATIENT)
Dept: INTERNAL MEDICINE | Facility: CLINIC | Age: 83
End: 2023-01-01
Payer: MEDICARE

## 2023-01-01 ENCOUNTER — THERAPY VISIT (OUTPATIENT)
Dept: PHYSICAL THERAPY | Facility: CLINIC | Age: 83
End: 2023-01-01
Attending: INTERNAL MEDICINE
Payer: MEDICARE

## 2023-01-01 VITALS
BODY MASS INDEX: 24.78 KG/M2 | SYSTOLIC BLOOD PRESSURE: 126 MMHG | HEART RATE: 87 BPM | TEMPERATURE: 99 F | DIASTOLIC BLOOD PRESSURE: 80 MMHG | HEIGHT: 69 IN | RESPIRATION RATE: 13 BRPM | WEIGHT: 167.3 LBS | OXYGEN SATURATION: 96 %

## 2023-01-01 DIAGNOSIS — M25.50 ARTHRALGIA, UNSPECIFIED JOINT: ICD-10-CM

## 2023-01-01 DIAGNOSIS — M25.50 ARTHRALGIA, UNSPECIFIED JOINT: Primary | ICD-10-CM

## 2023-01-01 DIAGNOSIS — M54.42 CHRONIC BILATERAL LOW BACK PAIN WITH BILATERAL SCIATICA: ICD-10-CM

## 2023-01-01 DIAGNOSIS — M54.2 CERVICALGIA: ICD-10-CM

## 2023-01-01 DIAGNOSIS — M54.41 CHRONIC BILATERAL LOW BACK PAIN WITH BILATERAL SCIATICA: ICD-10-CM

## 2023-01-01 DIAGNOSIS — G89.29 CHRONIC BILATERAL LOW BACK PAIN WITH BILATERAL SCIATICA: ICD-10-CM

## 2023-01-01 DIAGNOSIS — M80.00XD AGE-RELATED OSTEOPOROSIS WITH CURRENT PATHOLOGICAL FRACTURE WITH ROUTINE HEALING, SUBSEQUENT ENCOUNTER: ICD-10-CM

## 2023-01-01 DIAGNOSIS — Z23 NEED FOR SHINGLES VACCINE: ICD-10-CM

## 2023-01-01 DIAGNOSIS — K21.00 GASTROESOPHAGEAL REFLUX DISEASE WITH ESOPHAGITIS WITHOUT HEMORRHAGE: ICD-10-CM

## 2023-01-01 DIAGNOSIS — E78.5 HYPERLIPIDEMIA LDL GOAL <130: ICD-10-CM

## 2023-01-01 DIAGNOSIS — Z12.11 COLON CANCER SCREENING: ICD-10-CM

## 2023-01-01 DIAGNOSIS — M80.00XD AGE-RELATED OSTEOPOROSIS WITH CURRENT PATHOLOGICAL FRACTURE WITH ROUTINE HEALING, SUBSEQUENT ENCOUNTER: Primary | ICD-10-CM

## 2023-01-01 DIAGNOSIS — Z13.29 SCREENING FOR HYPOTHYROIDISM: ICD-10-CM

## 2023-01-01 DIAGNOSIS — D75.89 MACROCYTOSIS WITHOUT ANEMIA: ICD-10-CM

## 2023-01-01 DIAGNOSIS — Z00.00 ENCOUNTER FOR SUBSEQUENT ANNUAL WELLNESS VISIT IN MEDICARE PATIENT: Primary | ICD-10-CM

## 2023-01-01 LAB
ALBUMIN SERPL BCG-MCNC: 4.2 G/DL (ref 3.5–5.2)
ALP SERPL-CCNC: 87 U/L (ref 40–129)
ALT SERPL W P-5'-P-CCNC: 18 U/L (ref 0–70)
ANION GAP SERPL CALCULATED.3IONS-SCNC: 13 MMOL/L (ref 7–15)
AST SERPL W P-5'-P-CCNC: 28 U/L (ref 0–45)
BILIRUB SERPL-MCNC: 0.8 MG/DL
BUN SERPL-MCNC: 17 MG/DL (ref 8–23)
CALCIUM SERPL-MCNC: 9.2 MG/DL (ref 8.8–10.2)
CHLORIDE SERPL-SCNC: 102 MMOL/L (ref 98–107)
CHOLEST SERPL-MCNC: 211 MG/DL
CREAT SERPL-MCNC: 0.99 MG/DL (ref 0.67–1.17)
DEPRECATED HCO3 PLAS-SCNC: 24 MMOL/L (ref 22–29)
ERYTHROCYTE [DISTWIDTH] IN BLOOD BY AUTOMATED COUNT: 12.7 % (ref 10–15)
GFR SERPL CREATININE-BSD FRML MDRD: 75 ML/MIN/1.73M2
GLUCOSE SERPL-MCNC: 100 MG/DL (ref 70–99)
HCT VFR BLD AUTO: 40.1 % (ref 40–53)
HDLC SERPL-MCNC: 54 MG/DL
HGB BLD-MCNC: 13.4 G/DL (ref 13.3–17.7)
LDLC SERPL CALC-MCNC: 137 MG/DL
MCH RBC QN AUTO: 35.4 PG (ref 26.5–33)
MCHC RBC AUTO-ENTMCNC: 33.4 G/DL (ref 31.5–36.5)
MCV RBC AUTO: 106 FL (ref 78–100)
NONHDLC SERPL-MCNC: 157 MG/DL
PLATELET # BLD AUTO: 215 10E3/UL (ref 150–450)
POTASSIUM SERPL-SCNC: 4.5 MMOL/L (ref 3.4–5.3)
PROT SERPL-MCNC: 9 G/DL (ref 6.4–8.3)
RBC # BLD AUTO: 3.78 10E6/UL (ref 4.4–5.9)
SODIUM SERPL-SCNC: 139 MMOL/L (ref 136–145)
TRIGL SERPL-MCNC: 101 MG/DL
VIT B12 SERPL-MCNC: 974 PG/ML (ref 232–1245)
WBC # BLD AUTO: 5.1 10E3/UL (ref 4–11)

## 2023-01-01 PROCEDURE — 97110 THERAPEUTIC EXERCISES: CPT | Mod: GP | Performed by: PHYSICAL THERAPIST

## 2023-01-01 PROCEDURE — T1013 SIGN LANG/ORAL INTERPRETER: HCPCS | Mod: U3 | Performed by: PHYSICAL THERAPIST

## 2023-01-01 PROCEDURE — 72040 X-RAY EXAM NECK SPINE 2-3 VW: CPT | Mod: TC | Performed by: RADIOLOGY

## 2023-01-01 PROCEDURE — 97112 NEUROMUSCULAR REEDUCATION: CPT | Mod: GP | Performed by: PHYSICAL THERAPIST

## 2023-01-01 PROCEDURE — 85027 COMPLETE CBC AUTOMATED: CPT | Performed by: INTERNAL MEDICINE

## 2023-01-01 PROCEDURE — 97161 PT EVAL LOW COMPLEX 20 MIN: CPT | Mod: GP | Performed by: PHYSICAL THERAPIST

## 2023-01-01 PROCEDURE — G0439 PPPS, SUBSEQ VISIT: HCPCS | Performed by: INTERNAL MEDICINE

## 2023-01-01 PROCEDURE — 82607 VITAMIN B-12: CPT | Performed by: INTERNAL MEDICINE

## 2023-01-01 PROCEDURE — 80053 COMPREHEN METABOLIC PANEL: CPT | Performed by: INTERNAL MEDICINE

## 2023-01-01 PROCEDURE — 80061 LIPID PANEL: CPT | Performed by: INTERNAL MEDICINE

## 2023-01-01 PROCEDURE — 72100 X-RAY EXAM L-S SPINE 2/3 VWS: CPT | Mod: TC | Performed by: RADIOLOGY

## 2023-01-01 PROCEDURE — 99442 PR PHYSICIAN TELEPHONE EVALUATION 11-20 MIN: CPT | Mod: 95 | Performed by: INTERNAL MEDICINE

## 2023-01-01 PROCEDURE — 36415 COLL VENOUS BLD VENIPUNCTURE: CPT | Performed by: INTERNAL MEDICINE

## 2023-01-01 PROCEDURE — 99213 OFFICE O/P EST LOW 20 MIN: CPT | Mod: 25 | Performed by: INTERNAL MEDICINE

## 2023-01-01 RX ORDER — FOLIC ACID 1 MG/1
1 TABLET ORAL DAILY
Qty: 90 TABLET | Refills: 0 | Status: SHIPPED | OUTPATIENT
Start: 2023-01-01

## 2023-01-01 RX ORDER — MULTIVITAMIN
1 TABLET ORAL DAILY
Qty: 90 TABLET | Refills: 0 | Status: SHIPPED | OUTPATIENT
Start: 2023-01-01

## 2023-01-01 RX ORDER — FOLIC ACID 1 MG/1
1 TABLET ORAL DAILY
Qty: 90 TABLET | Refills: 0 | Status: SHIPPED | OUTPATIENT
Start: 2023-01-01 | End: 2023-01-01

## 2023-01-01 RX ORDER — ALENDRONATE SODIUM 70 MG/1
70 TABLET ORAL
Qty: 12 TABLET | Refills: 3 | Status: SHIPPED | OUTPATIENT
Start: 2023-01-01

## 2023-01-01 RX ORDER — MULTIVITAMIN
1 TABLET ORAL DAILY
Qty: 90 TABLET | Refills: 0 | Status: SHIPPED | OUTPATIENT
Start: 2023-01-01 | End: 2023-01-01

## 2023-01-01 RX ORDER — MULTIVITAMIN
1 TABLET ORAL DAILY
Qty: 90 TABLET | Refills: 0 | Status: CANCELLED | OUTPATIENT
Start: 2023-01-01

## 2023-01-01 ASSESSMENT — ACTIVITIES OF DAILY LIVING (ADL)
CURRENT_FUNCTION: NO ASSISTANCE NEEDED
CURRENT_FUNCTION: HOUSEWORK REQUIRES ASSISTANCE

## 2023-04-04 NOTE — PROGRESS NOTES
Subjective:  HPI                    Objective:  System    Physical Exam    General     ROS    Assessment/Plan:    DISCHARGE REPORT    Progress reporting period is from 9-7-19 to 11-5-19.       SUBJECTIVE  Subjective changes noted by patient:  pt notes decreased pain and increased strength.       Current pain level is 1/10  .     Previous pain level was  2/10  .   Changes in function:  Pt notes pain with walking greater than 15 minutes and standing greater than 10 minutes. Pt notes increased ease with sitting and sleeping. Pt still notes some difficulty with getting into bed. Getting out of bed improved.   Adverse reaction to treatment or activity: None    OBJECTIVE  Changes noted in objective findings:  Pt still notes difficulty with maintaining an upright posture. Pt demonstrates improved strength back extensors and scapular stabilizers.         ASSESSMENT/PLAN  Updated problem list and treatment plan: Diagnosis 1:  Thoracolumbar pain  Pain -  self management, education and home program  Decreased ROM/flexibility - therapeutic exercise, therapeutic activity and home program  Decreased strength - therapeutic exercise, therapeutic activities and home program  STG/LTGs have been met or progress has been made towards goals:  Yes,   Assessment of Progress: The patient's condition is improving.  Self Management Plans:  Patient has been instructed in a home treatment program.  I have re-evaluated this patient and find that the nature, scope, duration and intensity of the therapy is appropriate for the medical condition of the patient.  Michelle continues to require the following intervention to meet STG and LTG's:  PT intervention is no longer required to meet STG/LTG.    Recommendations:  This patient is ready to be discharged from therapy and continue their home treatment program.    Please refer to the daily flowsheet for treatment today, total treatment time and time spent performing 1:1 timed codes.           Paramedian Forehead Flap Division And Inset Text: Division and inset of the paramedian forehead flap was performed to achieve optimal aesthetic result, restore normal anatomic appearance and avoid distortion of normal anatomy, expedite and facilitate wound healing, achieve optimal functional result and because linear closure either not possible or would produce suboptimal result. The patient was prepped and draped in the usual manner. The pedicle was infiltrated with local anesthesia. The pedicle was sectioned with a #15 blade. The pedicle was de-bulked and trimmed to match the shape of the defect. Hemostasis was achieved. The flap donor site and free margin of the flap were secured with deep buried sutures and the wound edges were re-approximated.

## 2023-06-14 NOTE — TELEPHONE ENCOUNTER
Dr. Glasgow,    Last OV March 2022. Patient is scheduled for an appointment in a couple of days.     Appointments in Next Year    Jun 19, 2023 10:10 AM  Annual Wellness Visit with Tevin Glasgow MD, [facility]  [facility] ([facility] ) [phone]        Please advise regarding mediation refills.     Kina Whitmore RN

## 2023-06-19 NOTE — PROGRESS NOTES
"SUBJECTIVE:   Michelle is a 83 year old who presents for Preventive Visit.          A signing  was used.  Are you in the first 12 months of your Medicare coverage?  No    Healthy Habits:     In general, how would you rate your overall health?  Good    Frequency of exercise:  2-3 days/week    Duration of exercise:  15-30 minutes    Do you usually eat at least 4 servings of fruit and vegetables a day, include whole grains    & fiber and avoid regularly eating high fat or \"junk\" foods?  No    Taking medications regularly:  Yes    Medication side effects:  None    Ability to successfully perform activities of daily living:  Housework requires assistance and no assistance needed    Home Safety:  No safety concerns identified    Hearing Impairment:  No hearing concerns    In the past 6 months, have you been bothered by leaking of urine?  No    In general, how would you rate your overall mental or emotional health?  Fair      PHQ-2 Total Score: 0    Additional concerns today:  Yes        Have you ever done Advance Care Planning? (For example, a Health Directive, POLST, or a discussion with a medical provider or your loved ones about your wishes): No, advance care planning information given to patient to review.  Advanced care planning was discussed at today's visit.       Fall risk:  low    Cognitive Screening   1) Repeat 3 items (Leader, Season, Table)    2) Clock draw: ABNORMAL Numbers, unable to complete  3) 3 item recall: Recalls 2 objects   Results: ABNORMAL clock, 1-2 items recalled: PROBABLE COGNITIVE IMPAIRMENT, **INFORM PROVIDER**    Mini-CogTM Copyright MILES Galloway. Licensed by the author for use in [facility]; reprinted with permission (nils@.[facility]). All rights reserved.      Do you have sleep apnea, excessive snoring or daytime drowsiness?: no    Reviewed and updated as needed this visit by clinical staff   Tobacco  Allergies  Meds  Problems             Reviewed and updated as needed this " visit by Provider     Meds             Social History     Tobacco Use     Smoking status: Never     Smokeless tobacco: Never   Vaping Use     Vaping status: Not on file   Substance Use Topics     Alcohol use: Yes     Comment: 1-2 per week           6/19/2023    10:14 AM   Alcohol Use   Prescreen: >3 drinks/day or >7 drinks/week? No     Do you have a current opioid prescription? No  Do you use any other controlled substances or medications that are not prescribed by a provider? None      Current Outpatient Medications   Medication     acetaminophen (TYLENOL) 500 MG tablet     alendronate (FOSAMAX) 70 MG tablet     folic acid (FOLVITE) 1 MG tablet     Multiple Vitamin (MULTIVITAMIN) TABS     omeprazole (PRILOSEC) 20 MG DR capsule     SYSTANE COMPLETE 0.6 % SOLN ophthalmic solution     vitamin D3 (VITAMIN D3) 1000 units (25 mcg) tablet     No current facility-administered medications for this visit.         Immunization History   Administered Date(s) Administered     COVID-19 Monovalent 18+ (Moderna) 02/12/2021, 03/12/2021, 11/22/2021     FLUAD(HD)65+ QUAD 09/13/2021     Influenza (High Dose) 3 valent vaccine 10/08/2015, 10/03/2016, 10/03/2017, 10/19/2018, 10/01/2020     Pneumococcal 23 valent 11/01/1993, 10/25/2004, 05/21/2012     TDAP (Adacel,Boostrix) 05/21/2012     Td (Adult), Adsorbed 09/08/1986     Zoster vaccine, live 03/04/2008     Other concerns:  1. Leg pain, electrical sensation   2. Frequent sneezing, runny nose- concerns about allergies   3. Sore neck   4. Shoulder soreness when sitting down too quickly, continued right lower back pain. Patient questioning if X-rays should be completed   5. Discuss foods that will help with sleep   6. Requesting renewal of handicap parking certificate     Current providers sharing in care for this patient include:   Patient Care Team:  Tevin Glasgow MD as PCP - General (Internal Medicine)  Tevin Glasgow MD as Assigned PCP    The following health maintenance items  "are reviewed in [facility] and correct as of today:  Health Maintenance   Topic Date Due     ZOSTER IMMUNIZATION (2 of 3) 04/29/2008     Pneumococcal Vaccine: 65+ Years (2 - PCV) 05/21/2013     COVID-19 Vaccine (4 - Moderna series) 01/17/2022     DTAP/TDAP/TD IMMUNIZATION (2 - Td or Tdap) 05/21/2022     ANNUAL REVIEW OF HM ORDERS  03/30/2023     MEDICARE ANNUAL WELLNESS VISIT  06/19/2024     FALL RISK ASSESSMENT  06/19/2024     ADVANCE CARE PLANNING  06/19/2028     PHQ-2 (once per calendar year)  Completed     INFLUENZA VACCINE  Completed     IPV IMMUNIZATION  Aged Out     MENINGITIS IMMUNIZATION  Aged Out           Review of Systems  CONSTITUTIONAL: NEGATIVE for fever, chills, change in weight  EYES: NEGATIVE for vision changes or irritation  ENT/MOUTH: NEGATIVE for ear, mouth and throat problems  RESP: NEGATIVE for significant cough or SOB  CV: NEGATIVE for chest pain, palpitations or peripheral edema  GI: NEGATIVE for nausea, abdominal pain, heartburn, or change in bowel habits  : NEGATIVE for frequency, dysuria, or hematuria  MUSCULOSKELETAL: NEGATIVE for significant arthralgias or myalgia  NEURO: NEGATIVE for weakness, dizziness or paresthesias  HEME: NEGATIVE for bleeding problems  PSYCHIATRIC: NEGATIVE for changes in mood or affect    OBJECTIVE:   /80   Pulse 87   Temp 99  F (37.2  C) (Temporal)   Resp 13   Ht 1.753 m (5' 9\")   Wt 75.9 kg (167 lb 4.8 oz)   SpO2 96%   BMI 24.71 kg/m   Estimated body mass index is 24.71 kg/m  as calculated from the following:    Height as of this encounter: 1.753 m (5' 9\").    Weight as of this encounter: 75.9 kg (167 lb 4.8 oz).     Physical Exam  GENERAL: alert and no distress  EYES: Eyes grossly normal to inspection, PERRL and conjunctivae and sclerae normal  HENT: deaf and nose and mouth without ulcers or lesions  NECK:  Mild decreased ROM cervical  RESP: lungs clear to auscultation - no rales, rhonchi or wheezes  CV: regular rate and rhythm, normal S1 S2, no S3 or " S4, no murmur, click or rub,   MS: no gross musculoskeletal defects noted, no edema  NEURO: No focal changes  PSYCH: mentation appears normal, affect baseline    ASSESSMENT / PLAN:   (Z00.00) Encounter for subsequent annual wellness visit in Medicare patient  (primary encounter diagnosis)  Comment: Was advised to update all vaccinations including COVID but the family member that was with him did not want to do so.  Plan: CBC with platelets, Comprehensive metabolic         panel, Lipid Profile          Disability parking sticker was filled out for the patient on his behalf    (E78.5) Hyperlipidemia LDL goal <130  Comment: Labs ordered as fasting per routine  Plan: Lipid Profile            (K21.00) Gastroesophageal reflux disease with esophagitis without hemorrhage  Comment: Supportive care with dietary restrictions and PPI as ordered  Plan:     (M80.00XD) Age-related osteoporosis with current pathological fracture with routine healing, subsequent encounter  Comment: Recommend continuing with Fosamax, medication refilled.  Plan: alendronate (FOSAMAX) 70 MG tablet,         OFFICE/OUTPT VISIT,EST,LEVL IV            (Z12.11) Colon cancer screening  Comment: Annual fit test discussed  Plan: Fecal colorectal cancer screen FIT            (Z23) Need for shingles vaccine  Comment: Advised but declined  Plan:     (M25.50) Arthralgia, unspecified joint  Comment: Discussed options.  Suggest cervical and lumbar imaging due to history of osteoporosis to rule out compression fracture.  Continue with observation and care and start physical therapy pending results  Plan: XR Cervical Spine 2/3 Views, XR Lumbar Spine         2/3 Views, OFFICE/OUTPT VISIT,EST,LEVL IV,         Physical Therapy Referral              Patient has been advised of split billing requirements and indicates understanding: Yes      COUNSELING:  Reviewed preventive health counseling, as reflected in patient instructions       Regular exercise       Healthy  diet/nutrition        He reports that he has never smoked. He has never used smokeless tobacco.      Appropriate preventive services were discussed with this patient, including applicable screening as appropriate for cardiovascular disease, diabetes, osteopenia/osteoporosis, and glaucoma.  As appropriate for age/gender, discussed screening for colorectal cancer, prostate cancer, breast cancer, and cervical cancer. Checklist reviewing preventive services available has been given to the patient.    Reviewed patients plan of care and provided an AVS. The Basic Care Plan (routine screening as documented in Health Maintenance) for Michelle meets the Care Plan requirement. This Care Plan has been established and reviewed with the Patient.    Tevin Glasgow MD  [facility]    Identified Health Risks:

## 2023-06-19 NOTE — PATIENT INSTRUCTIONS
Patient Education   Personalized Prevention Plan  You are due for the preventive services outlined below.  Your care team is available to assist you in scheduling these services.  If you have already completed any of these items, please share that information with your care team to update in your medical record.  Health Maintenance Due   Topic Date Due     Zoster (Shingles) Vaccine (2 of 3) 04/29/2008     Pneumococcal Vaccine (2 - PCV) 05/21/2013     COVID-19 Vaccine (4 - Moderna series) 01/17/2022     Diptheria Tetanus Pertussis (DTAP/TDAP/TD) Vaccine (2 - Td or Tdap) 05/21/2022     Annual Wellness Visit  03/30/2023     ANNUAL REVIEW OF HM ORDERS  03/30/2023

## 2023-06-26 PROBLEM — M54.2 CERVICALGIA: Status: ACTIVE | Noted: 2023-01-01

## 2023-06-26 PROBLEM — M54.41 CHRONIC BILATERAL LOW BACK PAIN WITH BILATERAL SCIATICA: Status: ACTIVE | Noted: 2023-01-01

## 2023-06-26 PROBLEM — M25.50 ARTHRALGIA, UNSPECIFIED JOINT: Status: ACTIVE | Noted: 2023-01-01

## 2023-06-26 PROBLEM — G89.29 CHRONIC BILATERAL LOW BACK PAIN WITH BILATERAL SCIATICA: Status: ACTIVE | Noted: 2023-01-01

## 2023-06-26 PROBLEM — M54.42 CHRONIC BILATERAL LOW BACK PAIN WITH BILATERAL SCIATICA: Status: ACTIVE | Noted: 2023-01-01

## 2023-06-26 NOTE — PROGRESS NOTES
PHYSICAL THERAPY EVALUATION  Type of Visit: Evaluation    See electronic medical record for Abuse and Falls Screening details.    Subjective         Pt c/o UB/N and LBP with radiation  Into R>L L/E.  States pain is greatest in lower back area.  Denies injury.  MD order date 6/19/2023.  X-rays:There are vertebral body compression fracture deformities from T10 through L5. T10 and T11 were not imaged previously and are of uncertain age. The L1 and L2 fractures may have progressed. L3-L5 deformities appear similar.  Mild cervical kyphosis centered at C4-C5. Slight anterolisthesis of C4. Moderate degenerative interspace narrowing C5-C6 and C6-C7.         Presenting condition or subjective complaint: R sided back pain when sitting or sitting down quickly  Date of onset: 06/19/23 (MD order date)    Relevant medical history: Arthritis; Asthma; Depression; Dizziness; Hearing problems; Multiple Sclerosis; Neck injury; Osteoporosis; Unexplained weight loss; Vision problems   Dates & types of surgery: knee surgery years ago    Prior diagnostic imaging/testing results: X-ray     Prior therapy history for the same diagnosis, illness or injury: No      Prior Level of Function   Transfers: Independent  Ambulation: Assistive equipment  ADL: Assistive equipment  IADL:     Living Environment  Social support: With family members   Type of home: Multi-level; Apartment/condo   Stairs to enter the home: No       Ramp: No   Stairs inside the home:         Help at home: Self Cares (home health aide/personal care attendant, family, etc)  Equipment owned: Walker with wheels     Employment: No    Hobbies/Interests:      Patient goals for therapy: sit pain free    Pain assessment: Pain present     Objective   LUMBAR SPINE EVALUATION  PAIN: Pain Level at Rest: 1/10  Pain Level with Use: 5/10  Pain Location: cervical spine, lumbar spine and minimal neck pain  Pain Quality: Aching, Sharp and Shooting  Pain Frequency: intermittent  Pain is Worst:  activity  Pain is Exacerbated By: sitting quickly, twisting/rolling in bed, transfers  Pain is Relieved By: otc medications and rest  Pain Progression: Unchanged  INTEGUMENTARY (edema, incisions):   POSTURE: Standing Posture: Rounded shoulders, Forward head, Lordosis decreased, Thoracic kyphosis increased  Sitting Posture: Rounded shoulders, Forward head, Lordosis decreased, Thoracic kyphosis increased  GAIT:   Weightbearing Status: WBAT  Assistive Device(s): Walker (four wheeled)  Gait Deviations: Selam decreased  Trunk flexion  BALANCE/PROPRIOCEPTION: Single Leg Stance Eyes Open (seconds): 1-2 sec B  WEIGHTBEARING ALIGNMENT:   NON-WEIGHTBEARING ALIGNMENT:    ROM:   (Degrees) Left AROM Left PROM  Right AROM Right PROM   Hip Flexion       Hip Extension       Hip Abduction       Hip Adduction       Hip Internal Rotation       Hip External Rotation       Knee Flexion       Knee Extension       Lumbar Side glide Max loss + Max loss +   Lumbar Flexion Mod/max loss +   Lumbar Extension Max loss ++ unable to reach neutral   Pain:   End feel:     PELVIC/SI SCREEN:   STRENGTH: poor core stab recruitment.  B L/E grossly 4-/5    MYOTOMES:   DTR S:   CORD SIGNS:   DERMATOMES: WNL  NEURAL TENSION: Lumbar WNL  FLEXIBILITY: decreased HS, hip flexor, piriformis B  LUMBAR/HIP Special Tests:    PELVIS/SI SPECIAL TESTS:   FUNCTIONAL TESTS:   PALPATION: R Lx parspinals  SPINAL SEGMENTAL CONCLUSIONS: Hypomobile Tx/Lx generally    CERVICAL SPINE EVALUATION  PAIN: Pain Level at Rest: 0/10  Pain Level with Use: 3/10  Pain Location: cervical spine  Pain Quality: Aching and Shooting  Pain Frequency: intermittent  Pain is Worst: daytime  Pain is Exacerbated By: truning head  Pain is Relieved By: otc medications and rest  Pain Progression: Unchanged  INTEGUMENTARY (edema, incisions):   POSTURE:   GAIT:   Weightbearing Status:   Assistive Device(s):   Gait Deviations:   BALANCE/PROPRIOCEPTION:   WEIGHTBEARING ALIGNMENT:   ROM:   (Degrees)  Left AROM Right AROM    Cervical Flexion Min/mod loss +    Cervical Extension Min/mod loss +    Cervical Side bend Max loss + Max loss +    Cervical Rotation Mod/max loss + Mod loss +    Cervical Protrusion     Cervical Retraction Mod/max loss +.  Rep in sitting: pain during/after    Thoracic Flexion Min loss    Thoracic Extension Max loss    Thoracic Rotation Mod loss + Mod loss +     Left AROM Left PROM Right AROM Right PROM   Shoulder Flexion       Shoulder Extension       Shoulder Abduction       Shoulder Adduction       Shoulder IR       Shoulder ER       Shoulder Horiz Abduction       Shoulder Horiz Adduction       Pain:   End Feel:     MYOTOMES: WNL  DTR S:   CORD SIGNS:   DERMATOMES: WNL  NEURAL TENSION: Cervical WNL  FLEXIBILITY: B pect major/minor, UT   SPECIAL TESTS:   PALPATION:   SPINAL SEGMENTAL CONCLUSIONS:       Assessment & Plan   CLINICAL IMPRESSIONS   Medical Diagnosis: Arthralgia, unspecified joint    Treatment Diagnosis: UB/N and LBP with radiation into R>L L/E   Impression/Assessment: Patient is a 83 year old male with R side UB/N and LBP with radicular R>L complaints.  The following significant findings have been identified: Pain, Decreased ROM/flexibility, Decreased joint mobility, Decreased strength, Decreased proprioception, Impaired gait, Impaired muscle performance, Decreased activity tolerance and Impaired posture. These impairments interfere with their ability to perform self care tasks, recreational activities, household chores, driving , household mobility and community mobility as compared to previous level of function.     Clinical Decision Making (Complexity):   Clinical Presentation: Stable/Uncomplicated  Clinical Presentation Rationale: based on medical and personal factors listed in PT evaluation  Clinical Decision Making (Complexity): Low complexity    PLAN OF CARE  Treatment Interventions:  Modalities: Cryotherapy, Hot Pack, Ultrasound  Interventions: Manual Therapy,  Neuromuscular Re-education, Therapeutic Activity, Therapeutic Exercise    Long Term Goals     PT Goal 1  Goal Identifier: Sitting  Goal Description: Be able to sit  60 minutes  and transfer sit to stand and stand to sit  painfree  Rationale:  (for personal hygiene;to allow rest from standing;for community transportation;for job requirements in their work place)  Target Date: 09/18/23      Frequency of Treatment: 1x/week  Duration of Treatment: 12 weeks    Recommended Referrals to Other Professionals:   Education Assessment:   Learner/Method: Patient;Demonstration;Pictures/Video  Education Comments: print    Risks and benefits of evaluation/treatment have been explained.   Patient/Family/caregiver agrees with Plan of Care.     Evaluation Time:     PT Eval, Low Complexity Minutes (57383): 20   Present: Yes: Language: ALS, ID Number/Identifier: -    Signing Clinician: Bulmaro Koehler PT      [facility]                                                                                   OUTPATIENT PHYSICAL THERAPY      PLAN OF TREATMENT FOR OUTPATIENT REHABILITATION   Patient's Last Name, First Name, Kimi Guzmann  ROSAMARIA YOB: 1940   Provider's Name   [facility]   Medical Record No.  [id number]     Onset Date: 06/19/23 (MD order date)  Start of Care Date: 06/26/23     Medical Diagnosis:  Arthralgia, unspecified joint      PT Treatment Diagnosis:  UB/N and LBP with radiation into R>L L/E Plan of Treatment  Frequency/Duration: 1x/week/ 12 weeks    Certification date from 06/26/23 to 09/18/23         See note for plan of treatment details and functional goals     Bulmaro Koehler, PT                         I CERTIFY THE NEED FOR THESE SERVICES FURNISHED UNDER        THIS PLAN OF TREATMENT AND WHILE UNDER MY CARE     (Physician attestation of this document indicates review and certification of the therapy plan).                   Referring Provider:  Tevin Glasgow      Initial Assessment  See Epic Evaluation- Start of Care Date: 06/26/23

## 2023-06-26 NOTE — TELEPHONE ENCOUNTER
----- Message from Tevin Glasgow MD sent at 6/26/2023  6:28 AM CDT -----  Please inform [facility] of [facility] sent but not reviewed after 5 days.      Michelle     Your basic panel including your routine electrolytes, sodium, potassium, kidney function and liver function tests appear essentially normal.     Your total protein is just slightly above normal range.  This is a nonspecific test but probably should be repeated in a couple months for reassurance if interested.  If persistently elevated there are some additional studies that could be done for secondary evaluation and reassurance.     Your blood sugar function tests are slightly abnormal and elevated and should be rechecked here in the clinic in 12 months with a follow-up visit with me, jarod.  I will look forward to seeing you at that time and please call to make an appointment.  In the meantime, please work on your diet limiting your carbohydrates and getting some good, regular exercise.     Your cholesterol is slightly high and could be treated more aggressively with better diet, exercise and weight loss.  Please follow-up with me to discuss your further medication options/changes if you are interested.     Your vitamin B12 level has returned normal.     Your white blood cell count, hemoglobin and platelet counts have also returned normal.     Your mean cell volume or MCV level is also slightly elevated.  This is a nonspecific test result and indicates how plump or full your red blood cells are and there are some additional studies that could also be done to better evaluate the cause and source for this test result if interested.     Dr. Glasgow   Written by Tevin Glasgow MD on 6/20/2023  6:35 AM CDT    Michelle,     The results of your lower lumbar spine x-rays demonstrates that there are vertebral body compression fraction deformities noted all the way from T10-L5.  It appears that the T10 and T11 were not imaged previously therefore it is difficult  to determine the age of these.  The T12-L5 compression fractures appear to be old although the L1 and L2 fractures may have progressed.  The L3-L5 deformities appear similar.  An MRI could be done to better and more accurately evaluate these changes for their acuteness.     Imaging of the cervical neck demonstrates some chronic degenerative changes throughout but essentially normal vertebral body heights.  There does not appear to be any soft tissue swelling.     I think progressing with physical therapy may be helpful.     Dr. Glasgow   Written by Tevin Glasgow MD on 6/19/2023 12:59 PM CDT

## 2023-06-27 NOTE — TELEPHONE ENCOUNTER
Would prefer patient make a follow-up phone visit with me to discuss the benefit of MRI imaging and further testing

## 2023-06-27 NOTE — TELEPHONE ENCOUNTER
Patient was called using . Scheduled.     Appointments in Next Year    Jun 29, 2023 11:30 AM  (Arrive by 11:10 AM)  Provider Visit with Tevin Glasgow MD  [facility] ([facility] ) [phone]

## 2023-06-29 NOTE — PROGRESS NOTES
Michelle is a 83 year old who is being evaluated via a billable telephone visit.      What phone number would you like to be contacted at? [phone]  How would you like to obtain your AVS? Aldo    Distant Location (provider location):  On-site    Assessment & Plan     Age-related osteoporosis with current pathological fracture with routine healing, subsequent encounter  Suggest checking vitamin D level.  Would also suggest MRI imaging of this back to determine chronic versus acute versus subacute compression fracture status.  - Vitamin D Deficiency; Future  - MR Lumbar Spine w/o Contrast; Future  - Folate; Future    Macrocytosis without anemia  I have discussed in the past with patient prior lab results and again discussed that he has had a chronic macrocytosis.  I have suggested some additional lab work for reassurance as secondary sources for his symptoms. Of note his hemoglobin is normal as is his renal function and calcium level as well as vitamin B12 level.  He has had slightly elevated total proteins in the past in the setting of a macrocytosis dating back least 8 years [facility]  - Protein electrophoresis; Future  - Folate; Future    Screening for hypothyroidism  As ordered  - TSH with free T4 reflex; Future  956}     See Patient Instructions    Tevin Glasgow MD  [facility]   Michelle is a 83 year old, presenting for the following health issues:  RECHECK    HPI     Visit was done with the aid of the sign .    Patient has been undergoing physical therapy for his low back pain and is slowly getting better.  He has had a notable history of osteoporosis and osteopenia in the past currently on Fosamax.  Recent imaging of his low back demonstrated compression deformities from T10-L5 some of which appear to be chronic as compared to noted findings of a distant MRI dating to 2015.  Prior lumbar x-rays in 2021 also noted  compressions at that time in the setting of degenerative changes.  Patient is following up to discuss issues in regards to need for an MRI.  He is on Fosamax.  Compression fractures appear to be a combination of chronic and potentially subacute.    XR LUMBAR SPINE TWO-THREE VIEWS   6/19/2023 12:07 PM     INDICATION: Arthralgia, unspecified joint  COMPARISON: Lumbar spine radiograph 2/18/2021                                                                    IMPRESSION: There are vertebral body compression fracture deformities from T10 through L5. T10 and T11 were not imaged previously and are of uncertain age. The L1 and L2 fractures may have progressed. L3-L5 deformities appear similar. MRI would more accurately evaluate for edema and recent injury.    Reviewed with patient that it appears that been labs dating back at least 8 years done at an outside site he has had a chronic macrocytosis with recently a slightly elevated total protein level in the setting of a stable hemoglobin most recently performed.    Review of Systems   CONSTITUTIONAL: NEGATIVE for fever, chills, change in weight  EYES: NEGATIVE for vision changes or irritation  ENT/MOUTH: NEGATIVE for ear, mouth and throat problems  RESP: NEGATIVE for significant cough or SOB  CV: NEGATIVE for chest pain, palpitations or peripheral edema  GI: NEGATIVE for nausea, abdominal pain, heartburn, or change in bowel habits  : NEGATIVE for frequency, dysuria, or hematuria  HEME: NEGATIVE for bleeding problems  PSYCHIATRIC: NEGATIVE for changes in mood or affect      Objective         Vitals:  No vitals were obtained today due to virtual visit.    Physical Exam   alert and no distress  PSYCH: Alert and oriented times 3; coherent speech, normal   rate and volume, able to articulate logical thoughts, able   to abstract reason, no tangential thoughts, no hallucinations   or delusions  His affect is normal  RESP: No cough, no audible wheezing, able to talk in full  sentences  Remainder of exam unable to be completed due to telephone visits    Hemoglobin   Date Value Ref Range Status   06/19/2023 13.4 13.3 - 17.7 g/dL Final   02/18/2021 13.0 (L) 13.3 - 17.7 g/dL Final     Creatinine   Date Value Ref Range Status   06/19/2023 0.99 0.67 - 1.17 mg/dL Final   02/18/2021 1.06 0.66 - 1.25 mg/dL Final     Lab Results   Component Value Date    ALT 18 06/19/2023    ALT 22 02/18/2021               Phone call duration: 16 minutes

## 2023-09-25 NOTE — PROGRESS NOTES
DISCHARGE  Reason for Discharge: Patient has failed to schedule further appointments.    Equipment Issued: none    Discharge Plan: Patient to continue home program.    Referring Provider:  Tevin Glasgow     08/08/23 0500   Appointment Info   Signing clinician's name / credentials Bulmaro Koehler PT   Total/Authorized Visits 12   Visits Used 6   Medical Diagnosis Arthralgia, unspecified joint   PT Tx Diagnosis UB/N and LBP with radiation into R>L L/E   Other pertinent information pt is deaf   Quick Adds Certification   Progress Note/Certification   Start of Care Date 06/26/23   Onset of illness/injury or Date of Surgery 06/19/23  (MD order date)   Therapy Frequency 1x/week   Predicted Duration 12 weeks   Certification date from 06/26/23   Certification date to 09/18/23   Progress Note Completed Date 06/26/23       Present Yes   Preferred Language ASL DEAF & B   PT Goal 1   Goal Identifier Sitting   Goal Description Be able to sit  60 minutes  and transfer sit to stand and stand to sit  painfree   Rationale   (for personal hygiene;to allow rest from standing;for community transportation;for job requirements in their work place)   Goal Progress Sit to stand 1/10 pain, sitting 30-45 minutes tolerance   Target Date 09/18/23   Subjective Report   Subjective Report Cont to note less constant pain in LB.  Feeling little stronger in legs   Objective Measures   Objective Measures Objective Measure 1;Objective Measure 2   Objective Measure 1   Objective Measure Pain 1-4/10 at IE   Details pain 1-2/10   Objective Measure 2   Objective Measure SLS with support x 10 sec fatigues quickly  Improved postural awareness with gait and HEP   Treatment Interventions (PT)   Interventions Therapeutic Procedure/Exercise;Neuromuscular Re-education   Therapeutic Procedure/Exercise   Therapeutic Procedures: strength, endurance, ROM, flexibillity minutes (21451) 20   Ther Proc 1 nu step sh7, lv 6   Ther Proc 1 -  Details 5'   PTRx Ther Proc 1 Scapular Retraction/Depression   PTRx Ther Proc 1 - Details 10x max cuing for retraction/depression.  Advised not leaning into chair to sit forward prior to doing exercise   PTRx Ther Proc 2 Roll Ins   PTRx Ther Proc 2 - Details 10x 5 sec hold   PTRx Ther Proc 3 Hip AROM Standing Extension   PTRx Ther Proc 3 - Details 10x supported B mod postural cuing.  Advised to keep small ROM   PTRx Ther Proc 4 Hip AROM Standing Abduction   PTRx Ther Proc 4 - Details 10x supported B mod postural cuing   PTRx Ther Proc 5 Standing Hip Flexion   PTRx Ther Proc 5 - Details 10x B cuing for upright posture    PTRx Ther Proc 6 Push-Up Plus At Wall   PTRx Ther Proc 6 - Details 10x done at counter today   PTRx Ther Proc 7 Short Arc Knee Extension   PTRx Ther Proc 7 - Details 10x AG B min/mod postural cuing   Neuromuscular Re-education   Neuromuscular re-ed of mvmt, balance, coord, kinesthetic sense, posture, proprioception minutes (02098) 15   Neuro Re-ed 1 balance drills at counter: side step, high stepping and retro   Neuro Re-ed 1 - Details 2 LOC   PTRx Neuro Re-ed 1 Shoulder Theraband Rows   PTRx Neuro Re-ed 1 - Details 10x RTB mod postural cuing (look straight ahead)   PTRx Neuro Re-ed 2 Shoulder Theraband Low Row/Pulldown   PTRx Neuro Re-ed 2 - Details 10x RTB mod postural cuing (look straight ahead)   PTRx Neuro Re-ed 3 Tubing Side Pulls   PTRx Neuro Re-ed 3 - Details 10x RTB B no twisting   PTRx Neuro Re-ed 4 Balance Single Leg Stance Supported and Unsupported   PTRx Neuro Re-ed 4 - Details 10sec x 5 B min postural cuing   Education   Learner/Method Patient;Demonstration;Pictures/Video   Education Comments print   Plan   Home program See PTRx   Total Session Time   Timed Code Treatment Minutes 35   Total Treatment Time (sum of timed and untimed services) 35

## (undated) RX ORDER — FENTANYL CITRATE 50 UG/ML
INJECTION, SOLUTION INTRAMUSCULAR; INTRAVENOUS
Status: DISPENSED
Start: 2019-11-26